# Patient Record
Sex: FEMALE | Race: WHITE | NOT HISPANIC OR LATINO | Employment: UNEMPLOYED | URBAN - METROPOLITAN AREA
[De-identification: names, ages, dates, MRNs, and addresses within clinical notes are randomized per-mention and may not be internally consistent; named-entity substitution may affect disease eponyms.]

---

## 2019-06-11 LAB
AMPHETAMINES UR QL: NEGATIVE
BUPRENORPHINE SERPL-MCNC: NEGATIVE NG/ML
C TRACH RRNA SPEC DONR QL NAA+PROBE: NEGATIVE
CANNABINOIDS SERPL QL: NEGATIVE
COCAINE SERPL CFM-MCNC: NORMAL NG/ML
EXTERNAL ABO GROUPING: (no result)
EXTERNAL AMPHETAMINE SCREEN URINE: NEGATIVE
EXTERNAL ANTIBODY SCREEN: NORMAL
EXTERNAL BARBITURATE SCREEN URINE: NORMAL
EXTERNAL BENZODIAZEPINE SCREEN URINE: NEGATIVE
EXTERNAL HEMATOCRIT: 39 %
EXTERNAL HEMOGLOBIN: 12.8 G/DL
EXTERNAL HEPATITIS B SURFACE ANTIGEN: NEGATIVE
EXTERNAL METHADONE SCREEN URINE: NEGATIVE
EXTERNAL PHENCYCLIDINE SCREEN URINE: NORMAL
EXTERNAL PLATELET COUNT: 248 K/ΜL
EXTERNAL PROPOXYPHENE SCREEN URINE: NORMAL
EXTERNAL RH FACTOR: NEGATIVE
EXTERNAL SYPHILIS RPR SCREEN: (no result)
HCV AB S/CO SERPL IA: NEGATIVE
HIV 1+2 AB+HIV1 P24 AG SERPL QL IA: NORMAL
N GONORRHOEA DNA SPEC QL NAA+PROBE: NEGATIVE
OPIATES UR QL: NEGATIVE
OXYCODONE UR QL SCN: NEGATIVE
RUBV IGG SERPL IA-ACNC: (no result)
TRICYCLICS UR QL SCN: NEGATIVE
VZV IGG SER QL: NORMAL

## 2019-07-02 LAB — 2ND TRIMESTER 4 SCREEN SERPL-IMP: NORMAL

## 2019-10-12 PROBLEM — Z01.419 WELL WOMAN EXAM: Status: ACTIVE | Noted: 2019-10-12

## 2019-10-12 PROBLEM — O09.90 HIGH-RISK PREGNANCY: Status: ACTIVE | Noted: 2019-10-12

## 2019-10-14 ENCOUNTER — LAB (OUTPATIENT)
Dept: LAB | Facility: HOSPITAL | Age: 29
End: 2019-10-14

## 2019-10-14 ENCOUNTER — INITIAL PRENATAL (OUTPATIENT)
Dept: OBSTETRICS AND GYNECOLOGY | Facility: CLINIC | Age: 29
End: 2019-10-14

## 2019-10-14 VITALS
SYSTOLIC BLOOD PRESSURE: 118 MMHG | WEIGHT: 146 LBS | DIASTOLIC BLOOD PRESSURE: 72 MMHG | HEIGHT: 61 IN | BODY MASS INDEX: 27.56 KG/M2

## 2019-10-14 DIAGNOSIS — O09.93 HIGH-RISK PREGNANCY IN THIRD TRIMESTER: Primary | ICD-10-CM

## 2019-10-14 DIAGNOSIS — O09.93 HIGH-RISK PREGNANCY IN THIRD TRIMESTER: ICD-10-CM

## 2019-10-14 DIAGNOSIS — Z67.91 RH NEGATIVE STATE IN ANTEPARTUM PERIOD, THIRD TRIMESTER: ICD-10-CM

## 2019-10-14 DIAGNOSIS — O26.893 RH NEGATIVE STATE IN ANTEPARTUM PERIOD, THIRD TRIMESTER: ICD-10-CM

## 2019-10-14 PROBLEM — O35.BXX0 FETUS WITH COMPLEX CONGENITAL HEART DISEASE, ANTEPARTUM: Status: ACTIVE | Noted: 2019-10-14

## 2019-10-14 PROBLEM — Z78.9 LANGUAGE BARRIER: Status: ACTIVE | Noted: 2019-10-14

## 2019-10-14 PROBLEM — Z60.3 LANGUAGE BARRIER: Status: ACTIVE | Noted: 2019-10-14

## 2019-10-14 PROBLEM — O99.013 ANEMIA IN PREGNANCY, THIRD TRIMESTER: Status: ACTIVE | Noted: 2019-10-14

## 2019-10-14 PROBLEM — Z75.8 LANGUAGE BARRIER: Status: ACTIVE | Noted: 2019-10-14

## 2019-10-14 LAB
BLD GP AB SCN SERPL QL: NEGATIVE
GLUCOSE 1H P 100 G GLC PO SERPL-MCNC: 127 MG/DL (ref 65–140)
HCT VFR BLD AUTO: 35.6 % (ref 34–46.6)
HGB BLD-MCNC: 11.8 G/DL (ref 12–15.9)

## 2019-10-14 PROCEDURE — 86850 RBC ANTIBODY SCREEN: CPT

## 2019-10-14 PROCEDURE — 82950 GLUCOSE TEST: CPT

## 2019-10-14 PROCEDURE — 36415 COLL VENOUS BLD VENIPUNCTURE: CPT

## 2019-10-14 PROCEDURE — 85018 HEMOGLOBIN: CPT

## 2019-10-14 PROCEDURE — 85014 HEMATOCRIT: CPT

## 2019-10-14 PROCEDURE — 99204 OFFICE O/P NEW MOD 45 MIN: CPT | Performed by: OBSTETRICS & GYNECOLOGY

## 2019-10-14 RX ORDER — PRENATAL VIT/IRON FUM/FOLIC AC 27MG-0.8MG
TABLET ORAL DAILY
COMMUNITY

## 2019-10-14 RX ORDER — FERROUS SULFATE 325(65) MG
325 TABLET ORAL
Qty: 60 TABLET | Refills: 4 | Status: SHIPPED | OUTPATIENT
Start: 2019-10-14 | End: 2020-02-05

## 2019-10-14 NOTE — PROGRESS NOTES
Chief Complaint   Patient presents with   • Initial Prenatal Visit       HPI: Mendy is a  currently at 34w4d who today reports the following:  Contractions - No; Leaking - No; Vaginal bleeding -  No; Swelling of extremities - No.  She is transferring care after moving to the [facility] from [place].  Records have been seen in advance.  Prenatal care is been unremarkable other than a fetal VSD.  She is Rh- and is yet to receive RhoGam.  To date she has not had Glucola.    ROS:  GI: Nausea - No; Constipation - No; Diarrhea - No    Neuro: Headache - No; Visual change - No      EXAM:  Vitals: See prenatal flowsheet   Abdomen: See prenatal flowsheet   Urine glucose/protein: See prenatal flowsheet   Pelvic: See prenatal flowsheet   MDM:   Impression: 1. Supervision of high risk pregnancy  2. Fetal VSD   3. Rh (-)   Tests done today: 1. Needs antibody screen, hemoglobin and Glucola   Topics discussed: 1. Continue with PNV's  2. Prenatal labs reviewed  3. none - she had no major complaints,questions or concerns   Tests scheduled today for her next visit:   GCT  HgB  ABS

## 2019-10-14 NOTE — PROGRESS NOTES
"Subjective   Chief Complaint   Patient presents with   • Initial Prenatal Visit       Mendy Cote is a 29 y.o. year old .  Patient's last menstrual period was 2019 (approximate).  She presents to be seen to initiate prenatal care.     Social History    Tobacco Use      Smoking status: Never Smoker      The following portions of the patient's history were reviewed and updated as appropriate:{history reviewed:84147::\"vital signs\",\"allergies\",\"current medications\",\"past medical history\",\"past social history\",\"past surgical history\",\"problem list\"}.    Objective   Lab Review   {Review - gyn labs:99506::\"No data reviewed\"}    Imaging   {Review - imagin::\"No data reviewed\"}    Assessment/Plan   ASSESSMENT  1. 29 y.o. year old  at ***  2. {Pregnancy Imp:47507::\"Supervision of high risk pregnancy\"}    PLAN  1. The problem list for pregnancy was initiated today  2. Tests ordered today:  Orders Placed This Encounter   Procedures   • Chlamydia trachomatis, Neisseria gonorrhoeae, PCR - , Cervix     This is an external result entered through the Results Console.   • Maternal Screen 4     This is an external result entered through the Results Console.     Order Specific Question:   [facility] Date of last menstrual period or estimated date of delivery (corresponding to calculation method):     Answer:   2019     Order Specific Question:   [facility] Gestational age calculation method:     Answer:   CHINYERE,EDC   • HIV-1 / O / 2 Ag / Antibody 4th Generation     This is an external result entered through the Results Console.   • Obstetric Panel     This is an external result entered through the Results Console.   • Urine Drug Screen - Urine, Clean Catch     This is an external result entered through the Results Console.   • Obstetric Panel     This is an external result entered through the Results Console.   • Hepatitis C Antibody     This is an external result entered through the Results Console.   • " "Varicella Zoster Antibody, IgG     This is an external result entered through the Results Console.     3. Testing for GC / Chlamydia / trichomonas {Actions; was done:31418::\"will need to be done at her next prenatal visit\"}  4. Genetic testing reviewed: {Genetic testing options:91929}  5. Information reviewed: {Pregnancy - first PN visit checklist:76520::\"exercise in pregnancy\",\"nutrition in pregnancy\",\"weight gain in pregnancy\",\"work and travel restrictions during pregnancy\",\"list of OTC medications acceptable in pregnancy\",\"call coverage groups\"}    Total time spent today with Mendy  was {Time spent:60621}.  Off this time, {Percentage:31233::\"> 50%\"} was spent face-to-face time coordinating care, answering her questions and counseling regarding {Counseling topics:25465::\"pathophysiology of her presenting problem along with plans for any diagnositc work-up and treatment\"}.    Follow up: {numbers 1-12:92661} {DAYS, WEEKS, MONTHS, YEARS:51866}       This note was electronically signed.    Yfn Galvan MD  October 14, 2019      "

## 2019-10-21 ENCOUNTER — ROUTINE PRENATAL (OUTPATIENT)
Dept: OBSTETRICS AND GYNECOLOGY | Facility: CLINIC | Age: 29
End: 2019-10-21

## 2019-10-21 VITALS — WEIGHT: 148 LBS | DIASTOLIC BLOOD PRESSURE: 74 MMHG | BODY MASS INDEX: 27.58 KG/M2 | SYSTOLIC BLOOD PRESSURE: 114 MMHG

## 2019-10-21 DIAGNOSIS — O99.013 ANEMIA IN PREGNANCY, THIRD TRIMESTER: ICD-10-CM

## 2019-10-21 DIAGNOSIS — O09.93 HIGH-RISK PREGNANCY IN THIRD TRIMESTER: Primary | ICD-10-CM

## 2019-10-21 DIAGNOSIS — O35.BXX4: ICD-10-CM

## 2019-10-21 DIAGNOSIS — Z78.9 LANGUAGE BARRIER: ICD-10-CM

## 2019-10-21 DIAGNOSIS — Z67.91 RH NEGATIVE STATE IN ANTEPARTUM PERIOD, THIRD TRIMESTER: ICD-10-CM

## 2019-10-21 DIAGNOSIS — O26.893 RH NEGATIVE STATE IN ANTEPARTUM PERIOD, THIRD TRIMESTER: ICD-10-CM

## 2019-10-21 LAB — GP B STREP RRNA SPEC QL PROBE: NEGATIVE

## 2019-10-21 PROCEDURE — 96372 THER/PROPH/DIAG INJ SC/IM: CPT | Performed by: OBSTETRICS & GYNECOLOGY

## 2019-10-21 PROCEDURE — 99213 OFFICE O/P EST LOW 20 MIN: CPT | Performed by: OBSTETRICS & GYNECOLOGY

## 2019-10-21 NOTE — PROGRESS NOTES
Chief Complaint   Patient presents with   • Routine Prenatal Visit       HPI: Mendy is a  currently at 35w4d who today reports the following:  Contractions - No; Leaking - No; Vaginal bleeding -  No; Swelling of extremities - No.    ROS:  GI: Nausea - No; Constipation - No; Diarrhea - No    Neuro: Headache - No; Visual change - No      EXAM:  Vitals: See prenatal flowsheet   Abdomen: See prenatal flowsheet   Urine glucose/protein: See prenatal flowsheet   Pelvic: See prenatal flowsheet   MDM:   Impression: 1. Supervision of high risk pregnancy  2. Anemia in pregnancy  3. Fetal VSD   4. Rh (-)   Tests done today: 1. GBS testing   Topics discussed: 1. Continue with PNV's  2. Prenatal labs reviewed  3. Rhogam   4. Needs U/S for position today   Tests scheduled today for her next visit:   none

## 2019-10-28 ENCOUNTER — DOCUMENTATION (OUTPATIENT)
Dept: OBSTETRICS AND GYNECOLOGY | Facility: CLINIC | Age: 29
End: 2019-10-28

## 2019-10-30 ENCOUNTER — ROUTINE PRENATAL (OUTPATIENT)
Dept: OBSTETRICS AND GYNECOLOGY | Facility: CLINIC | Age: 29
End: 2019-10-30

## 2019-10-30 VITALS — SYSTOLIC BLOOD PRESSURE: 122 MMHG | WEIGHT: 148 LBS | DIASTOLIC BLOOD PRESSURE: 74 MMHG | BODY MASS INDEX: 27.58 KG/M2

## 2019-10-30 DIAGNOSIS — Z67.91 RH NEGATIVE STATE IN ANTEPARTUM PERIOD, THIRD TRIMESTER: ICD-10-CM

## 2019-10-30 DIAGNOSIS — O26.893 RH NEGATIVE STATE IN ANTEPARTUM PERIOD, THIRD TRIMESTER: ICD-10-CM

## 2019-10-30 DIAGNOSIS — Z78.9 LANGUAGE BARRIER: ICD-10-CM

## 2019-10-30 DIAGNOSIS — O09.93 HIGH-RISK PREGNANCY IN THIRD TRIMESTER: ICD-10-CM

## 2019-10-30 DIAGNOSIS — O99.013 ANEMIA IN PREGNANCY, THIRD TRIMESTER: ICD-10-CM

## 2019-10-30 PROCEDURE — 99213 OFFICE O/P EST LOW 20 MIN: CPT | Performed by: OBSTETRICS & GYNECOLOGY

## 2019-10-30 NOTE — PROGRESS NOTES
Chief Complaint   Patient presents with   • Routine Prenatal Visit     Patient was seen today with help of a  and interview was conducted with his help.      HPI: Mendy is a  currently at 36w6d who today reports the following:  Contractions - No; Leaking - No; Vaginal bleeding -  No; Swelling of extremities - No.    ROS:  GI: Nausea - No; Constipation - No; Diarrhea - No    Neuro: Headache - No; Visual change - No      EXAM:  Vitals: See prenatal flowsheet   Abdomen: See prenatal flowsheet   Urine glucose/protein: See prenatal flowsheet   Pelvic: See prenatal flowsheet   MDM:   Impression: 1. Supervision of high risk pregnancy   2. Breech   Tests done today: 1. Ultrasound for position   Topics discussed: 1. Continue with PNV's  2. Prenatal labs reviewed  3. Explained process of external cephalic version with quoted success rate of 70% with re-flip rate of 5%.  Small risks of fetal distress necessitating emergent  section reviewed.  They will think about it and call back in the next couple of days to let us know if they want to try external cephalic version.  In the absence of converted to the vertex presentation will need  birth   Tests scheduled today for her next visit:   none

## 2019-10-31 ENCOUNTER — TELEPHONE (OUTPATIENT)
Dept: OBSTETRICS AND GYNECOLOGY | Facility: CLINIC | Age: 29
End: 2019-10-31

## 2019-10-31 ENCOUNTER — HOSPITAL ENCOUNTER (OUTPATIENT)
Facility: HOSPITAL | Age: 29
Setting detail: SURGERY ADMIT
End: 2019-10-31
Attending: OBSTETRICS & GYNECOLOGY | Admitting: OBSTETRICS & GYNECOLOGY

## 2019-10-31 ENCOUNTER — PREP FOR SURGERY (OUTPATIENT)
Dept: OTHER | Facility: HOSPITAL | Age: 29
End: 2019-10-31

## 2019-10-31 RX ORDER — SODIUM CHLORIDE, SODIUM LACTATE, POTASSIUM CHLORIDE, CALCIUM CHLORIDE 600; 310; 30; 20 MG/100ML; MG/100ML; MG/100ML; MG/100ML
125 INJECTION, SOLUTION INTRAVENOUS CONTINUOUS
Status: CANCELLED | OUTPATIENT
Start: 2019-10-31

## 2019-10-31 RX ORDER — SODIUM CHLORIDE 0.9 % (FLUSH) 0.9 %
3 SYRINGE (ML) INJECTION EVERY 12 HOURS SCHEDULED
Status: CANCELLED | OUTPATIENT
Start: 2019-10-31

## 2019-10-31 RX ORDER — CEFAZOLIN SODIUM 2 G/100ML
2 INJECTION, SOLUTION INTRAVENOUS ONCE
Status: CANCELLED | OUTPATIENT
Start: 2019-10-31 | End: 2019-10-31

## 2019-10-31 RX ORDER — SODIUM CHLORIDE 0.9 % (FLUSH) 0.9 %
1-10 SYRINGE (ML) INJECTION AS NEEDED
Status: CANCELLED | OUTPATIENT
Start: 2019-10-31

## 2019-10-31 RX ORDER — MISOPROSTOL 200 UG/1
800 TABLET ORAL AS NEEDED
Status: CANCELLED | OUTPATIENT
Start: 2019-10-31

## 2019-10-31 RX ORDER — PROMETHAZINE HYDROCHLORIDE 12.5 MG/1
12.5 TABLET ORAL EVERY 6 HOURS PRN
Status: CANCELLED | OUTPATIENT
Start: 2019-10-31

## 2019-10-31 RX ORDER — OXYTOCIN-SODIUM CHLORIDE 0.9% IV SOLN 30 UNIT/500ML 30-0.9/5 UT/ML-%
650 SOLUTION INTRAVENOUS ONCE
Status: CANCELLED | OUTPATIENT
Start: 2019-10-31 | End: 2019-10-31

## 2019-10-31 RX ORDER — PROMETHAZINE HYDROCHLORIDE 12.5 MG/1
12.5 SUPPOSITORY RECTAL EVERY 6 HOURS PRN
Status: CANCELLED | OUTPATIENT
Start: 2019-10-31

## 2019-10-31 RX ORDER — LIDOCAINE HYDROCHLORIDE 10 MG/ML
5 INJECTION, SOLUTION EPIDURAL; INFILTRATION; INTRACAUDAL; PERINEURAL AS NEEDED
Status: CANCELLED | OUTPATIENT
Start: 2019-10-31

## 2019-10-31 RX ORDER — TRISODIUM CITRATE DIHYDRATE AND CITRIC ACID MONOHYDRATE 500; 334 MG/5ML; MG/5ML
30 SOLUTION ORAL ONCE
Status: CANCELLED | OUTPATIENT
Start: 2019-10-31 | End: 2019-10-31

## 2019-10-31 RX ORDER — CARBOPROST TROMETHAMINE 250 UG/ML
250 INJECTION, SOLUTION INTRAMUSCULAR AS NEEDED
Status: CANCELLED | OUTPATIENT
Start: 2019-10-31

## 2019-10-31 RX ORDER — OXYTOCIN-SODIUM CHLORIDE 0.9% IV SOLN 30 UNIT/500ML 30-0.9/5 UT/ML-%
85 SOLUTION INTRAVENOUS ONCE
Status: CANCELLED | OUTPATIENT
Start: 2019-10-31 | End: 2019-10-31

## 2019-10-31 RX ORDER — METHYLERGONOVINE MALEATE 0.2 MG/ML
200 INJECTION INTRAVENOUS ONCE AS NEEDED
Status: CANCELLED | OUTPATIENT
Start: 2019-10-31

## 2019-10-31 RX ORDER — PROMETHAZINE HYDROCHLORIDE 25 MG/ML
12.5 INJECTION, SOLUTION INTRAMUSCULAR; INTRAVENOUS EVERY 4 HOURS PRN
Status: CANCELLED | OUTPATIENT
Start: 2019-10-31

## 2019-10-31 NOTE — TELEPHONE ENCOUNTER
Primary C/Section scheduled for 11/19/19 ay 130pm. I called Dinora to inform her and to tell Mendy about this date and time.  I will also send a packet to patient regarding this as well.

## 2019-10-31 NOTE — TELEPHONE ENCOUNTER
Putting in a case request so we can get it on the schedule.  We can talk to her about the date we selected when she comes in

## 2019-10-31 NOTE — TELEPHONE ENCOUNTER
Dinora - friend of patient called to inform that Mendy doesn't want to have the external cephalic version.

## 2019-11-06 ENCOUNTER — ROUTINE PRENATAL (OUTPATIENT)
Dept: OBSTETRICS AND GYNECOLOGY | Facility: CLINIC | Age: 29
End: 2019-11-06

## 2019-11-06 VITALS — SYSTOLIC BLOOD PRESSURE: 118 MMHG | DIASTOLIC BLOOD PRESSURE: 72 MMHG | WEIGHT: 152 LBS | BODY MASS INDEX: 28.33 KG/M2

## 2019-11-06 DIAGNOSIS — O99.013 ANEMIA IN PREGNANCY, THIRD TRIMESTER: ICD-10-CM

## 2019-11-06 DIAGNOSIS — O26.893 RH NEGATIVE STATE IN ANTEPARTUM PERIOD, THIRD TRIMESTER: ICD-10-CM

## 2019-11-06 DIAGNOSIS — O09.93 HIGH-RISK PREGNANCY IN THIRD TRIMESTER: ICD-10-CM

## 2019-11-06 DIAGNOSIS — Z67.91 RH NEGATIVE STATE IN ANTEPARTUM PERIOD, THIRD TRIMESTER: ICD-10-CM

## 2019-11-06 DIAGNOSIS — Z78.9 LANGUAGE BARRIER: ICD-10-CM

## 2019-11-06 PROCEDURE — 99213 OFFICE O/P EST LOW 20 MIN: CPT | Performed by: OBSTETRICS & GYNECOLOGY

## 2019-11-06 NOTE — PROGRESS NOTES
Chief Complaint   Patient presents with   • Routine Prenatal Visit       HPI: Mendy is a  currently at 37w6d who today reports the following:  Contractions - No; Leaking - No; Vaginal bleeding -  No; Swelling of extremities - No.    ROS:  GI: Nausea - No; Constipation - No; Diarrhea - No    Neuro: Headache - No; Visual change - No      EXAM:  Vitals: See prenatal flowsheet   Abdomen: See prenatal flowsheet   Urine glucose/protein: See prenatal flowsheet   Pelvic: See prenatal flowsheet   MDM:   Impression: 1. Supervision of high risk pregnancy   2. Probable conversion to vertex spontaneously   Tests done today: 1. U/S for position - now vertex!!!!   Topics discussed: 1. Continue with PNV's  2. Prenatal labs reviewed   Tests scheduled today for her next visit:   none

## 2019-11-14 ENCOUNTER — ROUTINE PRENATAL (OUTPATIENT)
Dept: OBSTETRICS AND GYNECOLOGY | Facility: CLINIC | Age: 29
End: 2019-11-14

## 2019-11-14 VITALS — WEIGHT: 153 LBS | DIASTOLIC BLOOD PRESSURE: 80 MMHG | SYSTOLIC BLOOD PRESSURE: 124 MMHG | BODY MASS INDEX: 28.52 KG/M2

## 2019-11-14 DIAGNOSIS — Z78.9 LANGUAGE BARRIER: ICD-10-CM

## 2019-11-14 DIAGNOSIS — O26.893 RH NEGATIVE STATE IN ANTEPARTUM PERIOD, THIRD TRIMESTER: ICD-10-CM

## 2019-11-14 DIAGNOSIS — O99.013 ANEMIA IN PREGNANCY, THIRD TRIMESTER: ICD-10-CM

## 2019-11-14 DIAGNOSIS — Z67.91 RH NEGATIVE STATE IN ANTEPARTUM PERIOD, THIRD TRIMESTER: ICD-10-CM

## 2019-11-14 DIAGNOSIS — O09.93 HIGH-RISK PREGNANCY IN THIRD TRIMESTER: ICD-10-CM

## 2019-11-14 PROCEDURE — 99213 OFFICE O/P EST LOW 20 MIN: CPT | Performed by: OBSTETRICS & GYNECOLOGY

## 2019-11-14 NOTE — PROGRESS NOTES
Chief Complaint   Patient presents with   • Routine Prenatal Visit       HPI: Mendy is a  currently at 39w0d who today reports the following:  Contractions - No; Leaking - No; Vaginal bleeding -  No; Swelling of extremities - No.    ROS:  GI: Nausea - No; Constipation - No; Diarrhea - No    Neuro: Headache - No; Visual change - No      EXAM:  Vitals: See prenatal flowsheet   Abdomen: See prenatal flowsheet   Urine glucose/protein: See prenatal flowsheet   Pelvic: See prenatal flowsheet   MDM:   Impression: 1. Supervision of low risk pregnancy  2. Anemia in pregnancy   Tests done today: 1. none   Topics discussed: 1. Continue with PNV's  2. Prenatal labs reviewed  3. IOL offered - declined   Tests scheduled today for her next visit:   none

## 2019-11-20 ENCOUNTER — ANESTHESIA EVENT (OUTPATIENT)
Dept: LABOR AND DELIVERY | Facility: HOSPITAL | Age: 29
End: 2019-11-20

## 2019-11-20 ENCOUNTER — HOSPITAL ENCOUNTER (INPATIENT)
Facility: HOSPITAL | Age: 29
LOS: 2 days | Discharge: HOME | End: 2019-11-22
Attending: OBSTETRICS & GYNECOLOGY | Admitting: OBSTETRICS & GYNECOLOGY

## 2019-11-20 ENCOUNTER — ANESTHESIA (OUTPATIENT)
Dept: LABOR AND DELIVERY | Facility: HOSPITAL | Age: 29
End: 2019-11-20

## 2019-11-20 PROBLEM — Z67.91 RH NEGATIVE STATE IN ANTEPARTUM PERIOD, THIRD TRIMESTER: Status: RESOLVED | Noted: 2019-10-14 | Resolved: 2019-11-20

## 2019-11-20 PROBLEM — O09.93 HIGH-RISK PREGNANCY IN THIRD TRIMESTER: Status: RESOLVED | Noted: 2019-10-12 | Resolved: 2019-11-20

## 2019-11-20 PROBLEM — O35.BXX0 FETUS WITH COMPLEX CONGENITAL HEART DISEASE, ANTEPARTUM: Status: RESOLVED | Noted: 2019-10-14 | Resolved: 2019-11-20

## 2019-11-20 PROBLEM — O99.013 ANEMIA IN PREGNANCY, THIRD TRIMESTER: Status: RESOLVED | Noted: 2019-10-14 | Resolved: 2019-11-20

## 2019-11-20 PROBLEM — Z3A.39 39 WEEKS GESTATION OF PREGNANCY: Status: RESOLVED | Noted: 2019-11-20 | Resolved: 2019-11-20

## 2019-11-20 PROBLEM — Z34.90 PREGNANCY: Status: ACTIVE | Noted: 2019-11-20

## 2019-11-20 PROBLEM — Z3A.39 39 WEEKS GESTATION OF PREGNANCY: Status: ACTIVE | Noted: 2019-11-20

## 2019-11-20 PROBLEM — Z60.3 LANGUAGE BARRIER: Status: RESOLVED | Noted: 2019-10-14 | Resolved: 2019-11-20

## 2019-11-20 PROBLEM — O26.893 RH NEGATIVE STATE IN ANTEPARTUM PERIOD, THIRD TRIMESTER: Status: RESOLVED | Noted: 2019-10-14 | Resolved: 2019-11-20

## 2019-11-20 PROBLEM — Z78.9 LANGUAGE BARRIER: Status: RESOLVED | Noted: 2019-10-14 | Resolved: 2019-11-20

## 2019-11-20 PROBLEM — Z75.8 LANGUAGE BARRIER: Status: RESOLVED | Noted: 2019-10-14 | Resolved: 2019-11-20

## 2019-11-20 LAB
ABO GROUP BLD: NORMAL
ABO GROUP BLD: NORMAL
AMPHET+METHAMPHET UR QL: NEGATIVE
AMPHETAMINES UR QL: NEGATIVE
ANTI-D, PASSIVE: NORMAL
BARBITURATES UR QL SCN: NEGATIVE
BENZODIAZ UR QL SCN: NEGATIVE
BLD GP AB SCN SERPL QL: POSITIVE
BUPRENORPHINE SERPL-MCNC: NEGATIVE NG/ML
CANNABINOIDS SERPL QL: NEGATIVE
COCAINE UR QL: NEGATIVE
DEPRECATED RDW RBC AUTO: 48.5 FL (ref 37–54)
ERYTHROCYTE [DISTWIDTH] IN BLOOD BY AUTOMATED COUNT: 15.3 % (ref 12.3–15.4)
FETAL BLEED: NEGATIVE
HCT VFR BLD AUTO: 36.5 % (ref 34–46.6)
HGB BLD-MCNC: 12.2 G/DL (ref 12–15.9)
MCH RBC QN AUTO: 29.1 PG (ref 26.6–33)
MCHC RBC AUTO-ENTMCNC: 33.4 G/DL (ref 31.5–35.7)
MCV RBC AUTO: 87.1 FL (ref 79–97)
METHADONE UR QL SCN: NEGATIVE
NUMBER OF DOSES: NORMAL
OPIATES UR QL: NEGATIVE
OXYCODONE UR QL SCN: NEGATIVE
PCP UR QL SCN: NEGATIVE
PLATELET # BLD AUTO: 166 10*3/MM3 (ref 140–450)
PMV BLD AUTO: 11.5 FL (ref 6–12)
PROPOXYPH UR QL: NEGATIVE
RBC # BLD AUTO: 4.19 10*6/MM3 (ref 3.77–5.28)
RH BLD: NEGATIVE
RH BLD: NEGATIVE
T&S EXPIRATION DATE: NORMAL
TRICYCLICS UR QL SCN: NEGATIVE
WBC NRBC COR # BLD: 13.56 10*3/MM3 (ref 3.4–10.8)

## 2019-11-20 PROCEDURE — 25010000002 ROPIVACAINE PER 1 MG: Performed by: NURSE ANESTHETIST, CERTIFIED REGISTERED

## 2019-11-20 PROCEDURE — 85027 COMPLETE CBC AUTOMATED: CPT | Performed by: OBSTETRICS & GYNECOLOGY

## 2019-11-20 PROCEDURE — 25010000002 RHO D IMMUNE GLOBULIN 1500 UNIT/2ML SOLUTION PREFILLED SYRINGE: Performed by: OBSTETRICS & GYNECOLOGY

## 2019-11-20 PROCEDURE — 86850 RBC ANTIBODY SCREEN: CPT | Performed by: OBSTETRICS & GYNECOLOGY

## 2019-11-20 PROCEDURE — 86900 BLOOD TYPING SEROLOGIC ABO: CPT

## 2019-11-20 PROCEDURE — 85461 HEMOGLOBIN FETAL: CPT | Performed by: OBSTETRICS & GYNECOLOGY

## 2019-11-20 PROCEDURE — 86900 BLOOD TYPING SEROLOGIC ABO: CPT | Performed by: OBSTETRICS & GYNECOLOGY

## 2019-11-20 PROCEDURE — C1755 CATHETER, INTRASPINAL: HCPCS | Performed by: ANESTHESIOLOGY

## 2019-11-20 PROCEDURE — 59409 OBSTETRICAL CARE: CPT | Performed by: OBSTETRICS & GYNECOLOGY

## 2019-11-20 PROCEDURE — 0HQ9XZZ REPAIR PERINEUM SKIN, EXTERNAL APPROACH: ICD-10-PCS | Performed by: OBSTETRICS & GYNECOLOGY

## 2019-11-20 PROCEDURE — 25010000002 FENTANYL CITRATE (PF) 100 MCG/2ML SOLUTION: Performed by: NURSE ANESTHETIST, CERTIFIED REGISTERED

## 2019-11-20 PROCEDURE — 86870 RBC ANTIBODY IDENTIFICATION: CPT | Performed by: OBSTETRICS & GYNECOLOGY

## 2019-11-20 PROCEDURE — 86901 BLOOD TYPING SEROLOGIC RH(D): CPT | Performed by: OBSTETRICS & GYNECOLOGY

## 2019-11-20 PROCEDURE — C1755 CATHETER, INTRASPINAL: HCPCS

## 2019-11-20 PROCEDURE — 80307 DRUG TEST PRSMV CHEM ANLYZR: CPT | Performed by: OBSTETRICS & GYNECOLOGY

## 2019-11-20 PROCEDURE — 86901 BLOOD TYPING SEROLOGIC RH(D): CPT

## 2019-11-20 PROCEDURE — 59025 FETAL NON-STRESS TEST: CPT

## 2019-11-20 RX ORDER — OXYCODONE HYDROCHLORIDE AND ACETAMINOPHEN 5; 325 MG/1; MG/1
1 TABLET ORAL EVERY 4 HOURS PRN
Status: DISCONTINUED | OUTPATIENT
Start: 2019-11-20 | End: 2019-11-20 | Stop reason: HOSPADM

## 2019-11-20 RX ORDER — PROMETHAZINE HYDROCHLORIDE 25 MG/ML
12.5 INJECTION, SOLUTION INTRAMUSCULAR; INTRAVENOUS EVERY 4 HOURS PRN
Status: DISCONTINUED | OUTPATIENT
Start: 2019-11-20 | End: 2019-11-20

## 2019-11-20 RX ORDER — PROMETHAZINE HYDROCHLORIDE 25 MG/ML
12.5 INJECTION, SOLUTION INTRAMUSCULAR; INTRAVENOUS EVERY 6 HOURS PRN
Status: DISCONTINUED | OUTPATIENT
Start: 2019-11-20 | End: 2019-11-20 | Stop reason: HOSPADM

## 2019-11-20 RX ORDER — OXYTOCIN-SODIUM CHLORIDE 0.9% IV SOLN 30 UNIT/500ML 30-0.9/5 UT/ML-%
85 SOLUTION INTRAVENOUS ONCE
Status: DISCONTINUED | OUTPATIENT
Start: 2019-11-20 | End: 2019-11-20 | Stop reason: HOSPADM

## 2019-11-20 RX ORDER — METHYLERGONOVINE MALEATE 0.2 MG/ML
200 INJECTION INTRAVENOUS
Status: DISCONTINUED | OUTPATIENT
Start: 2019-11-20 | End: 2019-11-20

## 2019-11-20 RX ORDER — MISOPROSTOL 200 UG/1
800 TABLET ORAL ONCE
Status: DISCONTINUED | OUTPATIENT
Start: 2019-11-20 | End: 2019-11-20

## 2019-11-20 RX ORDER — SODIUM CHLORIDE 0.9 % (FLUSH) 0.9 %
1-10 SYRINGE (ML) INJECTION AS NEEDED
Status: DISCONTINUED | OUTPATIENT
Start: 2019-11-20 | End: 2019-11-20

## 2019-11-20 RX ORDER — SODIUM CHLORIDE, SODIUM LACTATE, POTASSIUM CHLORIDE, CALCIUM CHLORIDE 600; 310; 30; 20 MG/100ML; MG/100ML; MG/100ML; MG/100ML
125 INJECTION, SOLUTION INTRAVENOUS CONTINUOUS
Status: DISCONTINUED | OUTPATIENT
Start: 2019-11-20 | End: 2019-11-20

## 2019-11-20 RX ORDER — OXYCODONE HYDROCHLORIDE AND ACETAMINOPHEN 5; 325 MG/1; MG/1
1 TABLET ORAL EVERY 4 HOURS PRN
Status: DISCONTINUED | OUTPATIENT
Start: 2019-11-20 | End: 2019-11-22 | Stop reason: HOSPADM

## 2019-11-20 RX ORDER — SODIUM CHLORIDE 0.9 % (FLUSH) 0.9 %
3 SYRINGE (ML) INJECTION EVERY 12 HOURS SCHEDULED
Status: DISCONTINUED | OUTPATIENT
Start: 2019-11-20 | End: 2019-11-20

## 2019-11-20 RX ORDER — OXYTOCIN-SODIUM CHLORIDE 0.9% IV SOLN 30 UNIT/500ML 30-0.9/5 UT/ML-%
650 SOLUTION INTRAVENOUS ONCE
Status: DISCONTINUED | OUTPATIENT
Start: 2019-11-20 | End: 2019-11-20 | Stop reason: HOSPADM

## 2019-11-20 RX ORDER — PROMETHAZINE HYDROCHLORIDE 12.5 MG/1
12.5 TABLET ORAL EVERY 6 HOURS PRN
Status: DISCONTINUED | OUTPATIENT
Start: 2019-11-20 | End: 2019-11-20

## 2019-11-20 RX ORDER — METHYLERGONOVINE MALEATE 0.2 MG/ML
200 INJECTION INTRAVENOUS ONCE AS NEEDED
Status: DISCONTINUED | OUTPATIENT
Start: 2019-11-20 | End: 2019-11-20 | Stop reason: HOSPADM

## 2019-11-20 RX ORDER — DIPHENHYDRAMINE HYDROCHLORIDE 50 MG/ML
12.5 INJECTION INTRAMUSCULAR; INTRAVENOUS EVERY 8 HOURS PRN
Status: DISCONTINUED | OUTPATIENT
Start: 2019-11-20 | End: 2019-11-20

## 2019-11-20 RX ORDER — FAMOTIDINE 10 MG/ML
20 INJECTION, SOLUTION INTRAVENOUS ONCE AS NEEDED
Status: DISCONTINUED | OUTPATIENT
Start: 2019-11-20 | End: 2019-11-20

## 2019-11-20 RX ORDER — IBUPROFEN 600 MG/1
600 TABLET ORAL EVERY 6 HOURS PRN
Status: DISCONTINUED | OUTPATIENT
Start: 2019-11-20 | End: 2019-11-22 | Stop reason: HOSPADM

## 2019-11-20 RX ORDER — BUTORPHANOL TARTRATE 1 MG/ML
1 INJECTION, SOLUTION INTRAMUSCULAR; INTRAVENOUS
Status: DISCONTINUED | OUTPATIENT
Start: 2019-11-20 | End: 2019-11-20

## 2019-11-20 RX ORDER — OXYCODONE HYDROCHLORIDE AND ACETAMINOPHEN 5; 325 MG/1; MG/1
2 TABLET ORAL EVERY 4 HOURS PRN
Status: DISCONTINUED | OUTPATIENT
Start: 2019-11-20 | End: 2019-11-20

## 2019-11-20 RX ORDER — CARBOPROST TROMETHAMINE 250 UG/ML
250 INJECTION, SOLUTION INTRAMUSCULAR AS NEEDED
Status: DISCONTINUED | OUTPATIENT
Start: 2019-11-20 | End: 2019-11-20 | Stop reason: HOSPADM

## 2019-11-20 RX ORDER — OXYTOCIN-SODIUM CHLORIDE 0.9% IV SOLN 30 UNIT/500ML 30-0.9/5 UT/ML-%
650 SOLUTION INTRAVENOUS ONCE
Status: COMPLETED | OUTPATIENT
Start: 2019-11-20 | End: 2019-11-20

## 2019-11-20 RX ORDER — DOCUSATE SODIUM 100 MG/1
100 CAPSULE, LIQUID FILLED ORAL 2 TIMES DAILY PRN
Status: DISCONTINUED | OUTPATIENT
Start: 2019-11-20 | End: 2019-11-22 | Stop reason: HOSPADM

## 2019-11-20 RX ORDER — EPHEDRINE SULFATE/0.9% NACL/PF 25 MG/5 ML
10 SYRINGE (ML) INTRAVENOUS
Status: DISCONTINUED | OUTPATIENT
Start: 2019-11-20 | End: 2019-11-20

## 2019-11-20 RX ORDER — PROMETHAZINE HYDROCHLORIDE 12.5 MG/1
12.5 SUPPOSITORY RECTAL EVERY 6 HOURS PRN
Status: DISCONTINUED | OUTPATIENT
Start: 2019-11-20 | End: 2019-11-20

## 2019-11-20 RX ORDER — MISOPROSTOL 200 UG/1
800 TABLET ORAL AS NEEDED
Status: DISCONTINUED | OUTPATIENT
Start: 2019-11-20 | End: 2019-11-20 | Stop reason: HOSPADM

## 2019-11-20 RX ORDER — MAGNESIUM CARB/ALUMINUM HYDROX 105-160MG
30 TABLET,CHEWABLE ORAL ONCE
Status: DISCONTINUED | OUTPATIENT
Start: 2019-11-20 | End: 2019-11-20

## 2019-11-20 RX ORDER — CARBOPROST TROMETHAMINE 250 UG/ML
250 INJECTION, SOLUTION INTRAMUSCULAR
Status: DISCONTINUED | OUTPATIENT
Start: 2019-11-20 | End: 2019-11-20

## 2019-11-20 RX ORDER — TRISODIUM CITRATE DIHYDRATE AND CITRIC ACID MONOHYDRATE 500; 334 MG/5ML; MG/5ML
30 SOLUTION ORAL ONCE
Status: DISCONTINUED | OUTPATIENT
Start: 2019-11-20 | End: 2019-11-20

## 2019-11-20 RX ORDER — PROMETHAZINE HYDROCHLORIDE 12.5 MG/1
12.5 SUPPOSITORY RECTAL EVERY 6 HOURS PRN
Status: DISCONTINUED | OUTPATIENT
Start: 2019-11-20 | End: 2019-11-20 | Stop reason: HOSPADM

## 2019-11-20 RX ORDER — PROMETHAZINE HYDROCHLORIDE 12.5 MG/1
12.5 TABLET ORAL EVERY 6 HOURS PRN
Status: DISCONTINUED | OUTPATIENT
Start: 2019-11-20 | End: 2019-11-20 | Stop reason: HOSPADM

## 2019-11-20 RX ORDER — LIDOCAINE HYDROCHLORIDE AND EPINEPHRINE 15; 5 MG/ML; UG/ML
INJECTION, SOLUTION EPIDURAL AS NEEDED
Status: DISCONTINUED | OUTPATIENT
Start: 2019-11-20 | End: 2019-11-20 | Stop reason: SURG

## 2019-11-20 RX ORDER — ONDANSETRON 2 MG/ML
4 INJECTION INTRAMUSCULAR; INTRAVENOUS ONCE AS NEEDED
Status: DISCONTINUED | OUTPATIENT
Start: 2019-11-20 | End: 2019-11-20

## 2019-11-20 RX ORDER — OXYTOCIN-SODIUM CHLORIDE 0.9% IV SOLN 30 UNIT/500ML 30-0.9/5 UT/ML-%
2-24 SOLUTION INTRAVENOUS
Status: DISCONTINUED | OUTPATIENT
Start: 2019-11-20 | End: 2019-11-20

## 2019-11-20 RX ORDER — IBUPROFEN 600 MG/1
600 TABLET ORAL EVERY 6 HOURS PRN
Status: DISCONTINUED | OUTPATIENT
Start: 2019-11-20 | End: 2019-11-20

## 2019-11-20 RX ORDER — MORPHINE SULFATE 2 MG/ML
2 INJECTION, SOLUTION INTRAMUSCULAR; INTRAVENOUS
Status: DISCONTINUED | OUTPATIENT
Start: 2019-11-20 | End: 2019-11-20 | Stop reason: HOSPADM

## 2019-11-20 RX ORDER — IBUPROFEN 600 MG/1
600 TABLET ORAL EVERY 6 HOURS PRN
Status: DISCONTINUED | OUTPATIENT
Start: 2019-11-20 | End: 2019-11-20 | Stop reason: HOSPADM

## 2019-11-20 RX ORDER — BISACODYL 10 MG
10 SUPPOSITORY, RECTAL RECTAL DAILY PRN
Status: DISCONTINUED | OUTPATIENT
Start: 2019-11-21 | End: 2019-11-22 | Stop reason: HOSPADM

## 2019-11-20 RX ORDER — LIDOCAINE HYDROCHLORIDE 10 MG/ML
5 INJECTION, SOLUTION EPIDURAL; INFILTRATION; INTRACAUDAL; PERINEURAL AS NEEDED
Status: DISCONTINUED | OUTPATIENT
Start: 2019-11-20 | End: 2019-11-20

## 2019-11-20 RX ORDER — OXYCODONE HYDROCHLORIDE AND ACETAMINOPHEN 5; 325 MG/1; MG/1
2 TABLET ORAL EVERY 4 HOURS PRN
Status: DISCONTINUED | OUTPATIENT
Start: 2019-11-20 | End: 2019-11-20 | Stop reason: HOSPADM

## 2019-11-20 RX ORDER — FENTANYL CITRATE 50 UG/ML
INJECTION, SOLUTION INTRAMUSCULAR; INTRAVENOUS AS NEEDED
Status: DISCONTINUED | OUTPATIENT
Start: 2019-11-20 | End: 2019-11-20 | Stop reason: SURG

## 2019-11-20 RX ADMIN — Medication 13 ML/HR: at 10:45

## 2019-11-20 RX ADMIN — OXYTOCIN 650 ML/HR: 10 INJECTION INTRAVENOUS at 14:22

## 2019-11-20 RX ADMIN — IBUPROFEN 600 MG: 600 TABLET ORAL at 22:53

## 2019-11-20 RX ADMIN — SODIUM CHLORIDE, POTASSIUM CHLORIDE, SODIUM LACTATE AND CALCIUM CHLORIDE 1000 ML: 600; 310; 30; 20 INJECTION, SOLUTION INTRAVENOUS at 10:30

## 2019-11-20 RX ADMIN — SODIUM CHLORIDE, POTASSIUM CHLORIDE, SODIUM LACTATE AND CALCIUM CHLORIDE 125 ML/HR: 600; 310; 30; 20 INJECTION, SOLUTION INTRAVENOUS at 11:04

## 2019-11-20 RX ADMIN — HUMAN RHO(D) IMMUNE GLOBULIN 1500 UNITS: 1500 SOLUTION INTRAMUSCULAR; INTRAVENOUS at 22:39

## 2019-11-20 RX ADMIN — Medication 10 MG: at 13:41

## 2019-11-20 RX ADMIN — WITCH HAZEL 1 PAD: 500 SOLUTION RECTAL; TOPICAL at 18:43

## 2019-11-20 RX ADMIN — OXYTOCIN 2 MILLI-UNITS/MIN: 10 INJECTION, SOLUTION INTRAMUSCULAR; INTRAVENOUS at 09:12

## 2019-11-20 RX ADMIN — Medication: at 18:43

## 2019-11-20 RX ADMIN — Medication 10 MG: at 11:13

## 2019-11-20 RX ADMIN — SODIUM CHLORIDE, POTASSIUM CHLORIDE, SODIUM LACTATE AND CALCIUM CHLORIDE 125 ML/HR: 600; 310; 30; 20 INJECTION, SOLUTION INTRAVENOUS at 09:01

## 2019-11-20 RX ADMIN — LIDOCAINE HYDROCHLORIDE AND EPINEPHRINE 2 ML: 15; 5 INJECTION, SOLUTION EPIDURAL at 10:41

## 2019-11-20 RX ADMIN — HYDROCORTISONE 2.5% 1 APPLICATION: 25 CREAM TOPICAL at 18:43

## 2019-11-20 RX ADMIN — FENTANYL CITRATE 100 MCG: 50 INJECTION, SOLUTION INTRAMUSCULAR; INTRAVENOUS at 10:41

## 2019-11-20 RX ADMIN — ROPIVACAINE HYDROCHLORIDE 9 ML: 5 INJECTION, SOLUTION EPIDURAL; INFILTRATION; PERINEURAL at 10:43

## 2019-11-20 RX ADMIN — LIDOCAINE HYDROCHLORIDE AND EPINEPHRINE 3 ML: 15; 5 INJECTION, SOLUTION EPIDURAL at 10:38

## 2019-11-20 NOTE — NURSING NOTE
Up to br, unable to void.  Gia care pads and panties applied. Pt denies pain, bleeding light. Iv saline locked.  To NICU with .

## 2019-11-20 NOTE — PLAN OF CARE
Problem: Labor (Cervical Ripen, Induct, Augment) (Adult,Obstetrics,Pediatric)  Goal: Signs and Symptoms of Listed Potential Problems Will be Absent, Minimized or Managed (Labor)  Outcome: Outcome(s) achieved Date Met: 11/20/19

## 2019-11-20 NOTE — H&P
"[facility]  Mendy Cote  : 1990  MRN: [id number]  CSN: [id number]    History and Physical    Subjective   Chief Complaint   Patient presents with   • Contractions     every 5 minutes     Mendy Cote is a 29 y.o. year old  with an Estimated Date of Delivery: 19 currently at 39w6d presenting with minimal leaking fluid with associated irregular contractions.  She was evaluated upon admission thought not to be ruptured.  Contractions were infrequent.  Cervix is favorable.  She wishes to stay for augmentation of labor.  She also reports no vaginal bleeding.    Prenatal care has been with Dr. Galvan.  It has been complicated by a fetal VSD.  She has seen cardiology who thinks [facility].  She is Rh- and received RhoGam at 35 weeks.    Obstetric History       T3      L3     SAB3   TAB0   Ectopic0   Molar0   Multiple0   Live Births3       # Outcome Date GA Lbr Jae/2nd Weight Sex Delivery Anes PTL Lv   7 Current            6 Term 17   2466 g (5 lb 7 oz) F Vag-Spont   KARI      Name: Brittaneyittsha   5 2016           4 Term 05/17/15   2722 g (6 lb) F Vag-Spont   KARI      Name: Marielos   3 2014           2 Term 13   3175 g (7 lb) M Vag-Spont   KARI      Name: Eze   1 2011              Obstetric Comments   2013 - Marelyl    - Marielos   2017 - Anitta     No past medical history on file.  Past Surgical History:   Procedure Laterality Date   • D&C WITH SUCTION      Done in [facility]   • D&C WITH SUCTION     • D&C WITH SUCTION         No Known Allergies  Social History    Tobacco Use      Smoking status: Never Smoker    Review of Systems      Objective   /70 (BP Location: Right arm, Patient Position: Lying)   Pulse 86   Temp 98.1 °F (36.7 °C) (Oral)   Resp 16   Ht 156 cm (61.42\")   Wt 68 kg (149 lb 14.6 oz)   LMP 2019 (Approximate)   Breastfeeding? Yes   BMI 27.94 kg/m²   General: well developed; well " nourished  no acute distress   Heart: Not performed.   Lungs: breathing is unlabored   Abdomen: soft, non-tender; no masses  no umbilical or inguinal hernias are present  no hepato-splenomegaly   FHT's: reassuring and category 1   Cervix: was checked (by RN): 2 cm / 70 % / -1   Presentation: cephalic   Contractions: irregular     Prenatal Labs  Lab Results   Component Value Date    HGB 11.8 (L) 10/14/2019    RUBELLAABIGG immune 06/11/2019    HEPBSAG Negative 06/11/2019    ABSCRN Negative 10/14/2019    HLX0HHF4 neg 06/11/2019    HEPCVIRUSABY negative 06/11/2019     10/14/2019    STREPGPB negative 10/21/2019    CHLAMNAA NEGATIVE 06/11/2019    NGONORRHON negative 06/11/2019          Assessment   1. IUP at 39w6d  2. Group B strep status: negative  3. VSD  4. Rh (-) S/P Rhogam     Plan   1. Augment with pitocin    Yfn Galvan MD  11/20/2019  8:23 AM

## 2019-11-20 NOTE — L&D DELIVERY NOTE
[facility]  Vaginal Delivery Note    Delivery details     Delivery: Vaginal, Spontaneous     YOB: 2019    Time of Birth: 2:14 PM      Anesthesia: Epidural     Delivering clinician: Yfn Galvan    Forceps?   No   Vacuum? No    Shoulder dystocia present: No      Delivery narrative: Uncomplicated delivery from occiput anterior over intact perineum.  Mouth and nose bulb suction on perineum and again after delivery.  After 30 seconds of delay, cord clamped and infant placed on maternal abdomen.  After collecting cord bloods, placenta delivered spontaneously and intact with normal three-vessel cord.  Laceration in the midline just underneath the clitoris repaired with interrupted 3-0 chromic.  Small first-degree perineal laceration repaired with 3-0 chromic.    Infant details    Findings: male  infant     Infant observations: Weight: 3500 g (7 lb 11.5 oz)   Length: 20  in   Apgars:    @ 1 minute /       @ 5 minutes     Placenta, Cord, and Fluid    Placenta delivered  Spontaneous  at   11/20/2019  2:22 PM     Cord: 3 vessels  present.   Nuchal Cord?  no   Cord blood obtained: Yes      Repair    Episiotomy: None    Estimated Blood Loss: Est. Blood Loss (mL): 250 mL(Filed from Delivery Summary) (11/20/19 6212)       Complications  none    Disposition  Mother to Mother Baby/Postpartum  in stable condition currently.  Baby to NBN  in stable condition currently.      Yfn Galvan MD  11/20/19  2:36 PM

## 2019-11-20 NOTE — ANESTHESIA PROCEDURE NOTES
Labor Epidural      Patient reassessed immediately prior to procedure    Patient location during procedure: OB  Performed By  CRNA: Mery Berg CRNA  Preanesthetic Checklist  Completed: patient identified, surgical consent, pre-op evaluation, timeout performed, IV checked, risks and benefits discussed and monitors and equipment checked  Prep:  Pt Position:sitting  Sterile Tech:cap, gloves, mask and sterile barrier  Prep:DuraPrep  Monitoring:blood pressure monitoring  Epidural Block Procedure:  Approach:midline  Guidance:palpation technique  Location:L4-L5  Needle Type:Tuohy  Needle Gauge:17 G  Loss of Resistance Medium: saline  Loss of Resistance: 6cm  Cath Depth at skin:10 cm  Paresthesia: left and transient  Aspiration:negative  Test Dose:negative  Number of Attempts: 1  Post Assessment:  Dressing:occlusive dressing applied and secured with tape  Pt Tolerance:patient tolerated the procedure well with no apparent complications  Complications:no

## 2019-11-20 NOTE — ANESTHESIA PREPROCEDURE EVALUATION
Anesthesia Evaluation     Patient summary reviewed and Nursing notes reviewed   NPO Solid Status: > 6 hours  NPO Liquid Status: > 6 hours           Airway   Mallampati: II  TM distance: >3 FB  Neck ROM: full  No difficulty expected  Dental      Pulmonary - negative pulmonary ROS   Cardiovascular - negative cardio ROS        Neuro/Psych- negative ROS  GI/Hepatic/Renal/Endo - negative ROS     Musculoskeletal (-) negative ROS    Abdominal    Substance History - negative use     OB/GYN    (+) Pregnant,         Other                        Anesthesia Plan    ASA 2     epidural       Anesthetic plan, all risks, benefits, and alternatives have been provided, discussed and informed consent has been obtained with: patient.

## 2019-11-21 LAB
HCT VFR BLD AUTO: 35.9 % (ref 34–46.6)
HGB BLD-MCNC: 11.6 G/DL (ref 12–15.9)

## 2019-11-21 PROCEDURE — 99232 SBSQ HOSP IP/OBS MODERATE 35: CPT | Performed by: OBSTETRICS & GYNECOLOGY

## 2019-11-21 PROCEDURE — 85014 HEMATOCRIT: CPT | Performed by: OBSTETRICS & GYNECOLOGY

## 2019-11-21 PROCEDURE — 85018 HEMOGLOBIN: CPT | Performed by: OBSTETRICS & GYNECOLOGY

## 2019-11-21 RX ADMIN — DOCUSATE SODIUM 100 MG: 100 CAPSULE, LIQUID FILLED ORAL at 22:16

## 2019-11-21 RX ADMIN — IBUPROFEN 600 MG: 600 TABLET ORAL at 15:42

## 2019-11-21 RX ADMIN — IBUPROFEN 600 MG: 600 TABLET ORAL at 07:09

## 2019-11-21 RX ADMIN — IBUPROFEN 600 MG: 600 TABLET ORAL at 22:17

## 2019-11-21 NOTE — ANESTHESIA POSTPROCEDURE EVALUATION
Patient: Mendy Cote    Procedure Summary     Date:  11/20/19 Room / Location:      Anesthesia Start:  1025 Anesthesia Stop:  1422    Procedure:  LABOR ANALGESIA Diagnosis:      Scheduled Providers:   Provider:  Fish Rodriguez MD    Anesthesia Type:  epidural ASA Status:  2          Anesthesia Type: epidural  Last vitals  BP   111/65 (11/21/19 0700)   Temp   98.1 °F (36.7 °C) (11/21/19 0700)   Pulse   76 (11/21/19 0700)   Resp   16 (11/21/19 0700)     SpO2         Post Anesthesia Care and Evaluation    Patient location during evaluation: bedside  Patient participation: complete - patient participated  Level of consciousness: awake and alert  Pain management: adequate  Airway patency: patent  Anesthetic complications: No anesthetic complications    Cardiovascular status: acceptable  Respiratory status: acceptable  Hydration status: acceptable  Post Neuraxial Block status: Motor and sensory function returned to baseline and No signs or symptoms of PDPH

## 2019-11-21 NOTE — LACTATION NOTE
11/20/19 2000   Maternal Information   Date of Referral 11/20/19   Person Making Referral physician   Infant Reason for Referral NICU admission   Equipment Type   Breast Pump Type double electric, hospital grade   Breast Pump Flange Type hard   Breast Pump Flange Size 24 mm   Reproductive Interventions   Breastfeeding Assistance support offered   Breastfeeding Support encouragement provided;lactation counseling provided   Breast Pumping   Breast Pumping Interventions early pumping promoted;frequent pumping encouraged   Coping/Psychosocial Interventions   Parent/Child Attachment Promotion caring behavior modeled;positive reinforcement provided;strengths emphasized   Supportive Measures active listening utilized   Nursing initiated pumping while  in room. Used  nadeen to review milk supply, importance of pumping every 3 hours even when not getting milk, how to use breast pump and cleaning pump. Talked to RN and lactation will return tomorrow when  is in room to do additional teaching.

## 2019-11-21 NOTE — PROGRESS NOTES
Herber Cote  : 1990  MRN: [id number]  CSN: [id number]    Hospital Day: 2    Postpartum Day #1  Subjective     CC: hospital follow-up    Her pain is well controlled.  Vaginal bleeding is less than a normal period.       Objective     Min/max vitals past 24 hours:   Temp  Min: 97.7 °F (36.5 °C)  Max: 98.4 °F (36.9 °C)  BP  Min: 81/46  Max: 139/79  Pulse  Min: 56  Max: 110  Resp  Min: 16  Max: 18        Abdomen: soft, non-tender; bowel sounds normal; no masses   fundus firm and non-tender   Pelvic: deferred     Results from last 7 days   Lab Units 19  0854   WBC 10*3/mm3 13.56*   HEMOGLOBIN g/dL 12.2   HEMATOCRIT % 36.5   PLATELETS 10*3/mm3 166     Lab Results   Component Value Date    RH Negative 2019    HEPBSAG Negative 2019        Assessment   1. Postpartum Day #1 S/P vaginal delivery  2. Doing well     Plan   1. Continue routine postpartum care    Yfn Galvan MD  2019  6:55 AM

## 2019-11-21 NOTE — LACTATION NOTE
Infant remains in NICU. Mother continues to pump every 3hr for little to no yield. Instructed need for home pump once infant dc. Given and instructed rx. Also given amd instructed P4P.   Vietnamese  at bedside. Support/ encouragement/ education

## 2019-11-22 VITALS
DIASTOLIC BLOOD PRESSURE: 68 MMHG | BODY MASS INDEX: 28.3 KG/M2 | HEART RATE: 69 BPM | RESPIRATION RATE: 16 BRPM | TEMPERATURE: 98.3 F | WEIGHT: 149.91 LBS | SYSTOLIC BLOOD PRESSURE: 119 MMHG | HEIGHT: 61 IN

## 2019-11-22 PROCEDURE — 99238 HOSP IP/OBS DSCHRG MGMT 30/<: CPT | Performed by: OBSTETRICS & GYNECOLOGY

## 2019-11-22 RX ORDER — LANOLIN
CREAM (ML) TOPICAL AS NEEDED
Status: DISCONTINUED | OUTPATIENT
Start: 2019-11-22 | End: 2019-11-22 | Stop reason: HOSPADM

## 2019-11-22 RX ORDER — IBUPROFEN 600 MG/1
600 TABLET ORAL EVERY 6 HOURS PRN
Qty: 60 TABLET | Refills: 1 | Status: SHIPPED | OUTPATIENT
Start: 2019-11-22 | End: 2020-02-05

## 2019-11-22 RX ADMIN — DOCUSATE SODIUM 100 MG: 100 CAPSULE, LIQUID FILLED ORAL at 11:29

## 2019-11-22 RX ADMIN — IBUPROFEN 600 MG: 600 TABLET ORAL at 11:29

## 2019-11-22 RX ADMIN — Medication 1 APPLICATION: at 11:29

## 2019-11-22 NOTE — PROGRESS NOTES
PRANAV Cote  : 1990  MRN: [id number]  CSN: [id number]    Postpartum Day #2  Subjective   Her pain is well controlled.  Vaginal bleeding is less than a normal period. Baby had to go to the NICU for respiratory assistance.      Objective     Min/max vitals past 24 hours:   Temp  Min: 97.7 °F (36.5 °C)  Max: 98.3 °F (36.8 °C)  BP  Min: 111/65  Max: 119/68  Pulse  Min: 69  Max: 84  Resp  Min: 16  Max: 16        General: well developed; well nourished  no acute distress   Abdomen: fundus firm and non-tender   Pelvic: Not performed   Ext: Calves NT     Results from last 7 days   Lab Units 19  0823 19  0854   WBC 10*3/mm3  --  13.56*   HEMOGLOBIN g/dL 11.6* 12.2   HEMATOCRIT % 35.9 36.5   PLATELETS 10*3/mm3  --  166     Lab Results   Component Value Date    RH Negative 2019    HEPBSAG Negative 2019        Assessment   1. Postpartum Day #2 S/P vaginal delivery  2. Doing well     Plan   1. Discharge to home  2. Advised no tampons or intercourse for 6 weeks.  3. D/C questions all answered  4. Follow-up appointment in 6 week(s)    Margareth Contreras MD  2019  10:55 AM

## 2019-11-22 NOTE — DISCHARGE SUMMARY
Discharge Summary     Herber Cote  : 1990  MRN: [id number]  CSN: [id number]    Date of Admission: 2019   Date of Discharge:  2019   Delivering Physician: Yfn Galvan        Admission Diagnosis: 1. Pregnancy [Z34.90]   Discharge Diagnosis: 1. Same as above plus  2. Pregnancy at 39w6d - delivered       Procedures: 2019  - Vaginal, Spontaneous       Hospital Course  Patient is a 29 y.o.  who at 39w6d had a vaginal birth.  Her postpartum course was without complications.  On PPD #2 she was ready for discharge.  She had normal lochia and pain well controlled with oral medications.    Infant  male  fetus weighing 3500 g (7 lb 11.5 oz)   Apgars -  8  @ 1 minute /  9  @ 5 minutes.    Discharge labs  Lab Results   Component Value Date    WBC 13.56 (H) 2019    HGB 11.6 (L) 2019    HCT 35.9 2019     2019       Discharge Medications     Discharge Medications      New Medications      Instructions Start Date   benzocaine 20 % rectal ointment  Commonly known as:  AMERICAINE   1 application, Rectal, As Needed, Use up to 4 times a day as needed      benzocaine-lanolin-aloe vera 20-0.5 % aerosol topical spray  Commonly known as:  DERMOPLAST   Topical, As Needed, Use up to 4 times a day as needed      hydrocortisone 2.5 % rectal cream  Commonly known as:  ANUSOL-HC   1 application, Rectal, As Needed, Use up to 4 times a day as needed      ibuprofen 600 MG tablet  Commonly known as:  ADVIL,MOTRIN   600 mg, Oral, Every 6 Hours PRN      witch hazel-glycerin pad  Commonly known as:  TUCKS   1 each, Topical, As Needed, Use up to 4 times a day as needed         Continue These Medications      Instructions Start Date   ferrous sulfate 325 (65 FE) MG tablet   325 mg, Oral, 2 Times Daily Before Meals      prenatal vitamin 27-0.8 27-0.8 MG tablet tablet   Oral, Daily             Discharge Disposition Home or Self Care   Condition on Discharge: good    Follow-up: 6 weeks with Dr. Cole Contreras MD  11/22/2019

## 2019-11-22 NOTE — PAYOR COMM NOTE
"Mendy Elizabeth (29 y.o. Female)     Auth#[id number]    Discharged home 19    From: Luciana Davila  #[phone]  Fax#[phone]      Date of Birth Social Security Number Address Home Phone MRN    1990  [address] [place] [address]  [id number]    [employer] Marital Status          Mu-ism        Admission Date Admission Type Admitting Provider Attending Provider Department, Room/Bed    19 Elective Yfn Galvan MD  [facility] MOTHER BABY 4B, N424/1    Discharge Date Discharge Disposition Discharge Destination        2019 Home or Self Care              Attending Provider:  (none)   Allergies:  No Known Allergies    Isolation:  None   Infection:  None   Code Status:  CPR    Ht:  156 cm (61.42\")   Wt:  68 kg (149 lb 14.6 oz)    Admission Cmt:  None   Principal Problem:  Postpartum care following vaginal delivery 19 - [place]  [Z39.2]                 Active Insurance as of 2019     Primary Coverage     Payor Plan Insurance Group Employer/Plan Group    ANTHEM MEDICAID ANTH MEDICAID [id number]     Payor Plan Address Payor Plan Phone Number Payor Plan Fax Number Effective Dates    [address] [address] [phone]  2019 - None Entered    [facility] [id number]       Subscriber Name Subscriber Birth Date Member ID       MENDY ELIZABETH 1990 [id number]                 Emergency Contacts      (Rel.) Home Phone Work Phone Mobile Phone    CHEY ELIZABETH (Spouse) -- -- [phone]    Zulema Mccabe (Friend) -- -- [phone]               Discharge Summary      Margareth Contreras MD at 19 1057          Discharge Summary     Herber Elizabeth  : 1990  MRN: [id number]  CSN: [id number]    Date of Admission: 2019   Date of Discharge:  2019   Delivering Physician: Yfn Galvan        Admission Diagnosis: 1. Pregnancy [Z34.90]   Discharge Diagnosis: 1. Same as above " plus  2. Pregnancy at 39w6d - delivered       Procedures: 2019  - Vaginal, Spontaneous       Hospital Course  Patient is a 29 y.o.  who at 39w6d had a vaginal birth.  Her postpartum course was without complications.  On PPD #2 she was ready for discharge.  She had normal lochia and pain well controlled with oral medications.    Infant  male  fetus weighing 3500 g (7 lb 11.5 oz)   Apgars -  8  @ 1 minute /  9  @ 5 minutes.    Discharge labs  Lab Results   Component Value Date    WBC 13.56 (H) 2019    HGB 11.6 (L) 2019    HCT 35.9 2019     2019       Discharge Medications     Discharge Medications      New Medications      Instructions Start Date   benzocaine 20 % rectal ointment  Commonly known as:  AMERICAINE   1 application, Rectal, As Needed, Use up to 4 times a day as needed      benzocaine-lanolin-aloe vera 20-0.5 % aerosol topical spray  Commonly known as:  DERMOPLAST   Topical, As Needed, Use up to 4 times a day as needed      hydrocortisone 2.5 % rectal cream  Commonly known as:  ANUSOL-HC   1 application, Rectal, As Needed, Use up to 4 times a day as needed      ibuprofen 600 MG tablet  Commonly known as:  ADVIL,MOTRIN   600 mg, Oral, Every 6 Hours PRN      witch hazel-glycerin pad  Commonly known as:  TUCKS   1 each, Topical, As Needed, Use up to 4 times a day as needed         Continue These Medications      Instructions Start Date   ferrous sulfate 325 (65 FE) MG tablet   325 mg, Oral, 2 Times Daily Before Meals      prenatal vitamin 27-0.8 27-0.8 MG tablet tablet   Oral, Daily             Discharge Disposition Home or Self Care   Condition on Discharge: good   Follow-up: 6 weeks with Dr. Cole Contreras MD  2019      Electronically signed by Margareth Contreras MD at 19 5096

## 2020-01-03 ENCOUNTER — TELEPHONE (OUTPATIENT)
Dept: OBSTETRICS AND GYNECOLOGY | Facility: CLINIC | Age: 30
End: 2020-01-03

## 2020-02-03 NOTE — PROGRESS NOTES
Subjective   Chief Complaint   Patient presents with   • Postpartum Care     Mendy Cote is a 29 y.o. year old  presenting to be seen for her postpartum visit.  She had a vaginal delivery.  Her son is doing well.  History given through interpretor    Since delivery she has not been sexually active.  She does not have concerns about post-partum blues/depression.   She is breast feeding and plans to continues for 1 year(s).  For ongoing contraception, her plans are not using any form of contraception.    The following portions of the patient's history were reviewed and updated as appropriate:current medications and allergies    Social History    Tobacco Use      Smoking status: Never Smoker      Review of Systems  Constitutional POS: nothing reported    NEG: anorexia or night sweats   Genitourinary POS: nothing reported    NEG: dysuria or hematuria      Gastointestinal POS: nothing reported    NEG: bloating, change in bowel habits, melena or reflux symptoms   Breast POS: nothing reported    NEG: persistent breast lump, skin dimpling or nipple discharge        Objective   /74   Resp 14   Wt 57.2 kg (126 lb)   LMP 2019 (Approximate)   Breastfeeding Yes   BMI 23.49 kg/m²     General:  well developed; well nourished  no acute distress   Abdomen: soft, non-tender; no masses  no umbilical or inguinal hernias are present  no hepato-splenomegaly   Pelvis: Clinical staff was present for exam  External genitalia:  normal appearance of the external genitalia including Bartholin's and [place]'s glands.  :  urethral meatus normal;  Vaginal:  normal pink mucosa without prolapse or lesions.  Cervix:  normal appearance.  Uterus:  normal size, shape and consistency. fully involuted  Adnexa:  normal bimanual exam of the adnexa.          Assessment   1. Normal 6 week postpartum exam S/P vaginal delivery     Plan   1. BC options reviewed and compared today: none  2. The importance of keeping all planned  follow-up and taking all medications as prescribed was emphasized.  3. Follow up for annual exam 1 year         This note was electronically signed.    Yfn Galvan M.D.  February 5, 2020    Note: Speech recognition transcription software may have been used to create portions of this document.  An attempt at proofreading has been made but errors in transcription could still be present.

## 2020-02-05 ENCOUNTER — POSTPARTUM VISIT (OUTPATIENT)
Dept: OBSTETRICS AND GYNECOLOGY | Facility: CLINIC | Age: 30
End: 2020-02-05

## 2020-02-05 VITALS
DIASTOLIC BLOOD PRESSURE: 74 MMHG | WEIGHT: 126 LBS | BODY MASS INDEX: 23.49 KG/M2 | RESPIRATION RATE: 14 BRPM | SYSTOLIC BLOOD PRESSURE: 112 MMHG

## 2020-02-05 PROCEDURE — 99213 OFFICE O/P EST LOW 20 MIN: CPT | Performed by: OBSTETRICS & GYNECOLOGY

## 2022-08-12 ENCOUNTER — TRANSCRIBE ORDERS (OUTPATIENT)
Dept: LAB | Facility: HOSPITAL | Age: 32
End: 2022-08-12

## 2022-08-12 ENCOUNTER — LAB (OUTPATIENT)
Dept: LAB | Facility: HOSPITAL | Age: 32
End: 2022-08-12

## 2022-08-12 DIAGNOSIS — Z3A.13 13 WEEKS GESTATION OF PREGNANCY: Primary | ICD-10-CM

## 2022-08-12 DIAGNOSIS — Z3A.13 13 WEEKS GESTATION OF PREGNANCY: ICD-10-CM

## 2022-08-12 LAB
ABO GROUP BLD: NORMAL
BLD GP AB SCN SERPL QL: NEGATIVE
DEPRECATED RDW RBC AUTO: 37.4 FL (ref 37–54)
ERYTHROCYTE [DISTWIDTH] IN BLOOD BY AUTOMATED COUNT: 13.2 % (ref 12.3–15.4)
HCT VFR BLD AUTO: 37.5 % (ref 34–46.6)
HGB BLD-MCNC: 13.2 G/DL (ref 12–15.9)
MCH RBC QN AUTO: 28.3 PG (ref 26.6–33)
MCHC RBC AUTO-ENTMCNC: 35.2 G/DL (ref 31.5–35.7)
MCV RBC AUTO: 80.3 FL (ref 79–97)
PLATELET # BLD AUTO: 209 10*3/MM3 (ref 140–450)
PMV BLD AUTO: 11.5 FL (ref 6–12)
RBC # BLD AUTO: 4.67 10*6/MM3 (ref 3.77–5.28)
RH BLD: NEGATIVE
WBC NRBC COR # BLD: 8.91 10*3/MM3 (ref 3.4–10.8)

## 2022-08-12 PROCEDURE — 86803 HEPATITIS C AB TEST: CPT

## 2022-08-12 PROCEDURE — 86850 RBC ANTIBODY SCREEN: CPT

## 2022-08-12 PROCEDURE — 36415 COLL VENOUS BLD VENIPUNCTURE: CPT | Performed by: ADVANCED PRACTICE MIDWIFE

## 2022-08-12 PROCEDURE — G0432 EIA HIV-1/HIV-2 SCREEN: HCPCS

## 2022-08-12 PROCEDURE — 86900 BLOOD TYPING SEROLOGIC ABO: CPT

## 2022-08-12 PROCEDURE — 86780 TREPONEMA PALLIDUM: CPT

## 2022-08-12 PROCEDURE — 86762 RUBELLA ANTIBODY: CPT

## 2022-08-12 PROCEDURE — 87340 HEPATITIS B SURFACE AG IA: CPT

## 2022-08-12 PROCEDURE — 86787 VARICELLA-ZOSTER ANTIBODY: CPT

## 2022-08-12 PROCEDURE — 85027 COMPLETE CBC AUTOMATED: CPT | Performed by: ADVANCED PRACTICE MIDWIFE

## 2022-08-12 PROCEDURE — 86901 BLOOD TYPING SEROLOGIC RH(D): CPT

## 2022-08-13 LAB
HBV SURFACE AG SERPL QL IA: NORMAL
HCV AB SER DONR QL: NORMAL
HIV1+2 AB SER QL: NORMAL
RUBV IGG SERPL IA-ACNC: <0.9 INDEX
VZV IGG SER IA-ACNC: 440 INDEX

## 2022-08-14 LAB — TREPONEMA PALLIDUM IGG+IGM AB [PRESENCE] IN SERUM OR PLASMA BY IMMUNOASSAY: NON REACTIVE

## 2022-10-05 ENCOUNTER — APPOINTMENT (OUTPATIENT)
Dept: GENERAL RADIOLOGY | Facility: HOSPITAL | Age: 32
End: 2022-10-05

## 2022-10-05 ENCOUNTER — HOSPITAL ENCOUNTER (EMERGENCY)
Facility: HOSPITAL | Age: 32
Discharge: HOME | End: 2022-10-05
Attending: EMERGENCY MEDICINE | Admitting: EMERGENCY MEDICINE

## 2022-10-05 VITALS
RESPIRATION RATE: 16 BRPM | TEMPERATURE: 98 F | OXYGEN SATURATION: 98 % | HEIGHT: 61 IN | DIASTOLIC BLOOD PRESSURE: 71 MMHG | HEART RATE: 62 BPM | BODY MASS INDEX: 24.14 KG/M2 | SYSTOLIC BLOOD PRESSURE: 107 MMHG | WEIGHT: 127.87 LBS

## 2022-10-05 DIAGNOSIS — Z3A.21 21 WEEKS GESTATION OF PREGNANCY: ICD-10-CM

## 2022-10-05 DIAGNOSIS — S16.1XXA ACUTE STRAIN OF NECK MUSCLE, INITIAL ENCOUNTER: ICD-10-CM

## 2022-10-05 DIAGNOSIS — S40.012A CONTUSION OF LEFT SHOULDER, INITIAL ENCOUNTER: ICD-10-CM
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
## 2022-10-05 PROCEDURE — 72040 X-RAY EXAM NECK SPINE 2-3 VW: CPT

## 2022-10-05 PROCEDURE — 99284 EMERGENCY DEPT VISIT MOD MDM: CPT

## 2022-10-05 PROCEDURE — 73030 X-RAY EXAM OF SHOULDER: CPT

## 2022-10-05 PROCEDURE — G0378 HOSPITAL OBSERVATION PER HR: HCPCS
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
## 2022-10-06 ENCOUNTER — READMISSION MANAGEMENT (OUTPATIENT)
Dept: CALL CENTER | Facility: HOSPITAL | Age: 32
End: 2022-10-06

## 2022-10-07 ENCOUNTER — TRANSITIONAL CARE MANAGEMENT TELEPHONE ENCOUNTER (OUTPATIENT)
Dept: CALL CENTER | Facility: HOSPITAL | Age: 32
End: 2022-10-07
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
## 2022-10-21 ENCOUNTER — OFFICE VISIT (OUTPATIENT)
Dept: FAMILY MEDICINE CLINIC | Facility: CLINIC | Age: 32
End: 2022-10-21

## 2022-10-21 VITALS
BODY MASS INDEX: 23.67 KG/M2 | HEART RATE: 92 BPM | WEIGHT: 133.6 LBS | DIASTOLIC BLOOD PRESSURE: 62 MMHG | SYSTOLIC BLOOD PRESSURE: 98 MMHG | TEMPERATURE: 97.9 F | RESPIRATION RATE: 21 BRPM | HEIGHT: 63 IN | OXYGEN SATURATION: 98 %
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
## 2022-10-21 DIAGNOSIS — Z00.00 ANNUAL PHYSICAL EXAM: Primary | ICD-10-CM

## 2022-10-21 DIAGNOSIS — R10.12 LEFT UPPER QUADRANT PAIN: ICD-10-CM

## 2022-10-21 PROCEDURE — 99385 PREV VISIT NEW AGE 18-39: CPT | Performed by: STUDENT IN AN ORGANIZED HEALTH CARE EDUCATION/TRAINING PROGRAM

## 2022-10-21 PROCEDURE — 99213 OFFICE O/P EST LOW 20 MIN: CPT | Performed by: STUDENT IN AN ORGANIZED HEALTH CARE EDUCATION/TRAINING PROGRAM

## 2022-10-21 RX ORDER — FAMOTIDINE 20 MG/1
20 TABLET, FILM COATED ORAL 2 TIMES DAILY
Qty: 30 TABLET | Refills: 0 | Status: SHIPPED | OUTPATIENT
Start: 2022-10-21 | End: 2022-12-24 | Stop reason: HOSPADM

## 2022-10-21 NOTE — ASSESSMENT & PLAN NOTE
- Discussed diet and exercise: Discussed exercising at least 30 minutes a day 5 times per week, and eating a variety of fresh fruits, vegetables, and lean proteins and eating more complex carbohydrates  - Recommend annual dental and eye exams  - Pap smear up-to-date, reports last was 1-1/2 months ago and was normal  - Declines recommended COVID and flu vaccines today
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
## 2022-10-21 NOTE — PATIENT INSTRUCTIONS
Health Maintenance, Female  Adopting a healthy lifestyle and getting preventive care can go a long way to promote health and wellness. Talk with your health care provider about what schedule of regular examinations is right for you. This is a good chance for you to check in with your provider about disease prevention and staying healthy.  In between checkups, there are plenty of things you can do on your own. Experts have done a lot of research about which lifestyle changes and preventive measures are most likely to keep you healthy. Ask your health care provider for more information.  Weight and diet  Eat a healthy diet  Be sure to include plenty of vegetables, fruits, low-fat dairy products, and lean protein.  Do not eat a lot of foods high in solid fats, added sugars, or salt.  Get regular exercise. This is one of the most important things you can do for your health.  Most adults should exercise for at least 150 minutes each week. The exercise should increase your heart rate and make you sweat (moderate-intensity exercise).  Most adults should also do strengthening exercises at least twice a week. This is in addition to the moderate-intensity exercise.     Maintain a healthy weight  Body mass index (BMI) is a measurement that can be used to identify possible weight problems. It estimates body fat based on height and weight. Your health care provider can help determine your BMI and help you achieve or maintain a healthy weight.  For females 20 years of age and older:  A BMI below 18.5 is considered underweight.  A BMI of 18.5 to 24.9 is normal.  A BMI of 25 to 29.9 is considered overweight.  A BMI of 30 and above is considered obese.     Watch levels of cholesterol and blood lipids  You should start having your blood tested for lipids and cholesterol at 20 years of age, then have this test every 5 years.  You may need to have your cholesterol levels checked more often if:  Your lipid or cholesterol levels are  high.  You are older than 50 years of age.  You are at high risk for heart disease.     Cancer screening  Lung Cancer  Lung cancer screening is recommended for adults 55-80 years old who are at high risk for lung cancer because of a history of smoking.  A yearly low-dose CT scan of the lungs is recommended for people who:  Currently smoke.  Have quit within the past 15 years.  Have at least a 30-pack-year history of smoking. A pack year is smoking an average of one pack of cigarettes a day for 1 year.  Yearly screening should continue until it has been 15 years since you quit.  Yearly screening should stop if you develop a health problem that would prevent you from having lung cancer treatment.     Breast Cancer  Practice breast self-awareness. This means understanding how your breasts normally appear and feel.  It also means doing regular breast self-exams. Let your health care provider know about any changes, no matter how small.  If you are in your 20s or 30s, you should have a clinical breast exam (CBE) by a health care provider every 1-3 years as part of a regular health exam.  If you are 40 or older, have a CBE every year. Also consider having a breast X-ray (mammogram) every year.  If you have a family history of breast cancer, talk to your health care provider about genetic screening.  If you are at high risk for breast cancer, talk to your health care provider about having an MRI and a mammogram every year.  Breast cancer gene (BRCA) assessment is recommended for women who have family members with BRCA-related cancers. BRCA-related cancers include:  Breast.  Ovarian.  Tubal.  Peritoneal cancers.  Results of the assessment will determine the need for genetic counseling and BRCA1 and BRCA2 testing.     Cervical Cancer  Your health care provider may recommend that you be screened regularly for cancer of the pelvic organs (ovaries, uterus, and vagina). This screening involves a pelvic examination, including  checking for microscopic changes to the surface of your cervix (Pap test). You may be encouraged to have this screening done every 3 years, beginning at age 21.  For women ages 30-65, health care providers may recommend pelvic exams and Pap testing every 3 years, or they may recommend the Pap and pelvic exam, combined with testing for human papilloma virus (HPV), every 5 years. Some types of HPV increase your risk of cervical cancer. Testing for HPV may also be done on women of any age with unclear Pap test results.  Other health care providers may not recommend any screening for nonpregnant women who are considered low risk for pelvic cancer and who do not have symptoms. Ask your health care provider if a screening pelvic exam is right for you.  If you have had past treatment for cervical cancer or a condition that could lead to cancer, you need Pap tests and screening for cancer for at least 20 years after your treatment. If Pap tests have been discontinued, your risk factors (such as having a new sexual partner) need to be reassessed to determine if screening should resume. Some women have medical problems that increase the chance of getting cervical cancer. In these cases, your health care provider may recommend more frequent screening and Pap tests.     Colorectal Cancer  This type of cancer can be detected and often prevented.  Routine colorectal cancer screening usually begins at 50 years of age and continues through 75 years of age.  Your health care provider may recommend screening at an earlier age if you have risk factors for colon cancer.  Your health care provider may also recommend using home test kits to check for hidden blood in the stool.  A small camera at the end of a tube can be used to examine your colon directly (sigmoidoscopy or colonoscopy). This is done to check for the earliest forms of colorectal cancer.  Routine screening usually begins at age 50.  Direct examination of the colon should  be repeated every 5-10 years through 75 years of age. However, you may need to be screened more often if early forms of precancerous polyps or small growths are found.     Skin Cancer  Check your skin from head to toe regularly.  Tell your health care provider about any new moles or changes in moles, especially if there is a change in a mole's shape or color.  Also tell your health care provider if you have a mole that is larger than the size of a pencil eraser.  Always use sunscreen. Apply sunscreen liberally and repeatedly throughout the day.  Protect yourself by wearing long sleeves, pants, a wide-brimmed hat, and sunglasses whenever you are outside.     Heart disease, diabetes, and high blood pressure  High blood pressure causes heart disease and increases the risk of stroke. High blood pressure is more likely to develop in:  People who have blood pressure in the high end of the normal range (130-139/85-89 mm Hg).  People who are overweight or obese.  People who are .  If you are 18-39 years of age, have your blood pressure checked every 3-5 years. If you are 40 years of age or older, have your blood pressure checked every year. You should have your blood pressure measured twice--once when you are at a hospital or clinic, and once when you are not at a hospital or clinic. Record the average of the two measurements. To check your blood pressure when you are not at a hospital or clinic, you can use:  An automated blood pressure machine at a pharmacy.  A home blood pressure monitor.  If you are between 55 years and 79 years old, ask your health care provider if you should take aspirin to prevent strokes.  Have regular diabetes screenings. This involves taking a blood sample to check your fasting blood sugar level.  If you are at a normal weight and have a low risk for diabetes, have this test once every three years after 45 years of age.  If you are overweight and have a high risk for diabetes,  consider being tested at a younger age or more often.  Preventing infection  Hepatitis B  If you have a higher risk for hepatitis B, you should be screened for this virus. You are considered at high risk for hepatitis B if:  You were born in a country where hepatitis B is common. Ask your health care provider which countries are considered high risk.  Your parents were born in a high-risk country, and you have not been immunized against hepatitis B (hepatitis B vaccine).  You have HIV or AIDS.  You use needles to inject street drugs.  You live with someone who has hepatitis B.  You have had sex with someone who has hepatitis B.  You get hemodialysis treatment.  You take certain medicines for conditions, including cancer, organ transplantation, and autoimmune conditions.     Hepatitis C  Blood testing is recommended for:  Everyone born from 1945 through 1965.  Anyone with known risk factors for hepatitis C.     Sexually transmitted infections (STIs)  You should be screened for sexually transmitted infections (STIs) including gonorrhea and chlamydia if:  You are sexually active and are younger than 24 years of age.  You are older than 24 years of age and your health care provider tells you that you are at risk for this type of infection.  Your sexual activity has changed since you were last screened and you are at an increased risk for chlamydia or gonorrhea. Ask your health care provider if you are at risk.  If you do not have HIV, but are at risk, it may be recommended that you take a prescription medicine daily to prevent HIV infection. This is called pre-exposure prophylaxis (PrEP). You are considered at risk if:  You are sexually active and do not regularly use condoms or know the HIV status of your partner(s).  You take drugs by injection.  You are sexually active with a partner who has HIV.     Talk with your health care provider about whether you are at high risk of being infected with HIV. If you choose to  begin PrEP, you should first be tested for HIV. You should then be tested every 3 months for as long as you are taking PrEP.  Pregnancy  If you are premenopausal and you may become pregnant, ask your health care provider about preconception counseling.  If you may become pregnant, take 400 to 800 micrograms (mcg) of folic acid every day.  If you want to prevent pregnancy, talk to your health care provider about birth control (contraception).  Osteoporosis and menopause  Osteoporosis is a disease in which the bones lose minerals and strength with aging. This can result in serious bone fractures. Your risk for osteoporosis can be identified using a bone density scan.  If you are 65 years of age or older, or if you are at risk for osteoporosis and fractures, ask your health care provider if you should be screened.  Ask your health care provider whether you should take a calcium or vitamin D supplement to lower your risk for osteoporosis.  Menopause may have certain physical symptoms and risks.  Hormone replacement therapy may reduce some of these symptoms and risks.  Talk to your health care provider about whether hormone replacement therapy is right for you.  Follow these instructions at home:  Schedule regular health, dental, and eye exams.  Stay current with your immunizations.  Do not use any tobacco products including cigarettes, chewing tobacco, or electronic cigarettes.  If you are pregnant, do not drink alcohol.  If you are breastfeeding, limit how much and how often you drink alcohol.  Limit alcohol intake to no more than 1 drink per day for nonpregnant women. One drink equals 12 ounces of beer, 5 ounces of wine, or 1½ ounces of hard liquor.  Do not use street drugs.  Do not share needles.  Ask your health care provider for help if you need support or information about quitting drugs.  Tell your health care provider if you often feel depressed.  Tell your health care provider if you have ever been abused or do  not feel safe at home.  This information is not intended to replace advice given to you by your health care provider. Make sure you discuss any questions you have with your health care provider.  Document Released: 07/02/2012 Document Revised: 05/25/2017 Document Reviewed: 09/20/2016  [employer] Patient Education © 2018 [employer]

## 2022-10-21 NOTE — ASSESSMENT & PLAN NOTE
- Likely secondary to gastritis, has burning pain associated with spicy foods  - Can take Pepcid 20 mg twice daily as needed
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
## 2022-10-24 ENCOUNTER — LAB (OUTPATIENT)
Dept: LAB | Facility: HOSPITAL | Age: 32
End: 2022-10-24

## 2022-10-24 DIAGNOSIS — R10.12 LEFT UPPER QUADRANT PAIN: ICD-10-CM

## 2022-10-24 DIAGNOSIS — Z00.00 ANNUAL PHYSICAL EXAM: ICD-10-CM

## 2022-10-24 LAB
ALBUMIN SERPL-MCNC: 3.6 G/DL (ref 3.5–5.2)
ALBUMIN/GLOB SERPL: 1.5 G/DL
ALP SERPL-CCNC: 34 U/L (ref 39–117)
ALT SERPL W P-5'-P-CCNC: 9 U/L (ref 1–33)
ANION GAP SERPL CALCULATED.3IONS-SCNC: 10.5 MMOL/L (ref 5–15)
AST SERPL-CCNC: 14 U/L (ref 1–32)
BILIRUB SERPL-MCNC: 0.3 MG/DL (ref 0–1.2)
BUN SERPL-MCNC: 7 MG/DL (ref 6–20)
BUN/CREAT SERPL: 18.9 (ref 7–25)
CALCIUM SPEC-SCNC: 8.4 MG/DL (ref 8.6–10.5)
CHLORIDE SERPL-SCNC: 105 MMOL/L (ref 98–107)
CHOLEST SERPL-MCNC: 308 MG/DL (ref 0–200)
CO2 SERPL-SCNC: 22.5 MMOL/L (ref 22–29)
CREAT SERPL-MCNC: 0.37 MG/DL (ref 0.57–1)
EGFRCR SERPLBLD CKD-EPI 2021: 137.6 ML/MIN/1.73
GLOBULIN UR ELPH-MCNC: 2.4 GM/DL
GLUCOSE SERPL-MCNC: 74 MG/DL (ref 65–99)
HBA1C MFR BLD: 5 % (ref 4.8–5.6)
HDLC SERPL-MCNC: 81 MG/DL (ref 40–60)
LDLC SERPL CALC-MCNC: 198 MG/DL (ref 0–100)
LDLC/HDLC SERPL: 2.4 {RATIO}
LIPASE SERPL-CCNC: 33 U/L (ref 13–60)
POTASSIUM SERPL-SCNC: 4.1 MMOL/L (ref 3.5–5.2)
PROT SERPL-MCNC: 6 G/DL (ref 6–8.5)
SODIUM SERPL-SCNC: 138 MMOL/L (ref 136–145)
TRIGL SERPL-MCNC: 162 MG/DL (ref 0–150)
VLDLC SERPL-MCNC: 29 MG/DL (ref 5–40)

## 2022-10-24 PROCEDURE — 36415 COLL VENOUS BLD VENIPUNCTURE: CPT

## 2022-10-24 PROCEDURE — 83690 ASSAY OF LIPASE: CPT

## 2022-10-24 PROCEDURE — 80053 COMPREHEN METABOLIC PANEL: CPT

## 2022-10-24 PROCEDURE — 80061 LIPID PANEL: CPT

## 2022-10-24 PROCEDURE — 83036 HEMOGLOBIN GLYCOSYLATED A1C: CPT

## 2022-12-22 ENCOUNTER — HOSPITAL ENCOUNTER (INPATIENT)
Facility: HOSPITAL | Age: 32
LOS: 2 days | Discharge: HOME | End: 2022-12-24
Attending: OBSTETRICS & GYNECOLOGY | Admitting: OBSTETRICS & GYNECOLOGY
Payer: MEDICAID

## 2022-12-22 PROBLEM — O36.4XX0 IUFD AT 20 WEEKS OR MORE OF GESTATION: Status: ACTIVE | Noted: 2022-12-22

## 2022-12-22 LAB
ABO GROUP BLD: NORMAL
ABO GROUP BLD: NORMAL
BLD GP AB SCN SERPL QL: NEGATIVE
DEPRECATED RDW RBC AUTO: 40.3 FL (ref 37–54)
ERYTHROCYTE [DISTWIDTH] IN BLOOD BY AUTOMATED COUNT: 13.2 % (ref 12.3–15.4)
FIBRINOGEN PPP-MCNC: 308 MG/DL (ref 220–470)
HBA1C MFR BLD: 4.4 % (ref 4.8–5.6)
HCT VFR BLD AUTO: 31.4 % (ref 34–46.6)
HGB BLD-MCNC: 10.7 G/DL (ref 12–15.9)
MCH RBC QN AUTO: 28.8 PG (ref 26.6–33)
MCHC RBC AUTO-ENTMCNC: 34.1 G/DL (ref 31.5–35.7)
MCV RBC AUTO: 84.6 FL (ref 79–97)
PLATELET # BLD AUTO: 161 10*3/MM3 (ref 140–450)
PMV BLD AUTO: 11.2 FL (ref 6–12)
RBC # BLD AUTO: 3.71 10*6/MM3 (ref 3.77–5.28)
RH BLD: NEGATIVE
RH BLD: NEGATIVE
T&S EXPIRATION DATE: NORMAL
T4 FREE SERPL-MCNC: 0.84 NG/DL (ref 0.93–1.7)
TSH SERPL DL<=0.05 MIU/L-ACNC: 0.9 UIU/ML (ref 0.27–4.2)
WBC NRBC COR # BLD: 7.12 10*3/MM3 (ref 3.4–10.8)

## 2022-12-22 PROCEDURE — 84443 ASSAY THYROID STIM HORMONE: CPT | Performed by: OBSTETRICS & GYNECOLOGY

## 2022-12-22 PROCEDURE — 85613 RUSSELL VIPER VENOM DILUTED: CPT | Performed by: OBSTETRICS & GYNECOLOGY

## 2022-12-22 PROCEDURE — 25010000002 HYDROMORPHONE 1 MG/ML SOLUTION: Performed by: OBSTETRICS & GYNECOLOGY

## 2022-12-22 PROCEDURE — 25010000002 TERBUTALINE PER 1 MG: Performed by: OBSTETRICS & GYNECOLOGY

## 2022-12-22 PROCEDURE — 86592 SYPHILIS TEST NON-TREP QUAL: CPT | Performed by: OBSTETRICS & GYNECOLOGY

## 2022-12-22 PROCEDURE — 85027 COMPLETE CBC AUTOMATED: CPT | Performed by: OBSTETRICS & GYNECOLOGY

## 2022-12-22 PROCEDURE — 85732 THROMBOPLASTIN TIME PARTIAL: CPT | Performed by: OBSTETRICS & GYNECOLOGY

## 2022-12-22 PROCEDURE — 84439 ASSAY OF FREE THYROXINE: CPT | Performed by: OBSTETRICS & GYNECOLOGY

## 2022-12-22 PROCEDURE — 86900 BLOOD TYPING SEROLOGIC ABO: CPT | Performed by: OBSTETRICS & GYNECOLOGY

## 2022-12-22 PROCEDURE — 86644 CMV ANTIBODY: CPT | Performed by: OBSTETRICS & GYNECOLOGY

## 2022-12-22 PROCEDURE — 63710000001 PROMETHAZINE PER 12.5 MG: Performed by: OBSTETRICS & GYNECOLOGY

## 2022-12-22 PROCEDURE — 86147 CARDIOLIPIN ANTIBODY EA IG: CPT | Performed by: OBSTETRICS & GYNECOLOGY

## 2022-12-22 PROCEDURE — 86645 CMV ANTIBODY IGM: CPT | Performed by: OBSTETRICS & GYNECOLOGY

## 2022-12-22 PROCEDURE — 85670 THROMBIN TIME PLASMA: CPT | Performed by: OBSTETRICS & GYNECOLOGY

## 2022-12-22 PROCEDURE — 81241 F5 GENE: CPT | Performed by: OBSTETRICS & GYNECOLOGY

## 2022-12-22 PROCEDURE — 86850 RBC ANTIBODY SCREEN: CPT | Performed by: OBSTETRICS & GYNECOLOGY

## 2022-12-22 PROCEDURE — 25010000002 ONDANSETRON PER 1 MG: Performed by: OBSTETRICS & GYNECOLOGY

## 2022-12-22 PROCEDURE — 83036 HEMOGLOBIN GLYCOSYLATED A1C: CPT | Performed by: OBSTETRICS & GYNECOLOGY

## 2022-12-22 PROCEDURE — 10S0XZZ REPOSITION PRODUCTS OF CONCEPTION, EXTERNAL APPROACH: ICD-10-PCS | Performed by: OBSTETRICS & GYNECOLOGY

## 2022-12-22 PROCEDURE — 86747 PARVOVIRUS ANTIBODY: CPT | Performed by: OBSTETRICS & GYNECOLOGY

## 2022-12-22 PROCEDURE — 86901 BLOOD TYPING SEROLOGIC RH(D): CPT | Performed by: OBSTETRICS & GYNECOLOGY

## 2022-12-22 PROCEDURE — 85705 THROMBOPLASTIN INHIBITION: CPT | Performed by: OBSTETRICS & GYNECOLOGY

## 2022-12-22 PROCEDURE — 85384 FIBRINOGEN ACTIVITY: CPT | Performed by: OBSTETRICS & GYNECOLOGY

## 2022-12-22 RX ORDER — PROMETHAZINE HYDROCHLORIDE 12.5 MG/1
12.5 SUPPOSITORY RECTAL EVERY 6 HOURS PRN
Status: DISCONTINUED | OUTPATIENT
Start: 2022-12-22 | End: 2022-12-23

## 2022-12-22 RX ORDER — TERBUTALINE SULFATE 1 MG/ML
0.25 INJECTION, SOLUTION SUBCUTANEOUS AS NEEDED
Status: DISCONTINUED | OUTPATIENT
Start: 2022-12-22 | End: 2022-12-23

## 2022-12-22 RX ORDER — SODIUM CHLORIDE 0.9 % (FLUSH) 0.9 %
3-10 SYRINGE (ML) INJECTION AS NEEDED
Status: DISCONTINUED | OUTPATIENT
Start: 2022-12-22 | End: 2022-12-23

## 2022-12-22 RX ORDER — ONDANSETRON 2 MG/ML
4 INJECTION INTRAMUSCULAR; INTRAVENOUS EVERY 6 HOURS PRN
Status: DISCONTINUED | OUTPATIENT
Start: 2022-12-22 | End: 2022-12-23 | Stop reason: SDUPTHER

## 2022-12-22 RX ORDER — LIDOCAINE HYDROCHLORIDE 10 MG/ML
5 INJECTION, SOLUTION EPIDURAL; INFILTRATION; INTRACAUDAL; PERINEURAL AS NEEDED
Status: DISCONTINUED | OUTPATIENT
Start: 2022-12-22 | End: 2022-12-23

## 2022-12-22 RX ORDER — ACETAMINOPHEN 325 MG/1
650 TABLET ORAL EVERY 4 HOURS PRN
Status: DISCONTINUED | OUTPATIENT
Start: 2022-12-22 | End: 2022-12-23

## 2022-12-22 RX ORDER — OXYTOCIN/0.9 % SODIUM CHLORIDE 30/500 ML
2-30 PLASTIC BAG, INJECTION (ML) INTRAVENOUS
Status: DISCONTINUED | OUTPATIENT
Start: 2022-12-23 | End: 2022-12-23

## 2022-12-22 RX ORDER — ONDANSETRON 4 MG/1
4 TABLET, FILM COATED ORAL EVERY 6 HOURS PRN
Status: DISCONTINUED | OUTPATIENT
Start: 2022-12-22 | End: 2022-12-23 | Stop reason: SDUPTHER

## 2022-12-22 RX ORDER — MISOPROSTOL 100 MCG
25 TABLET ORAL
Status: ACTIVE | OUTPATIENT
Start: 2022-12-23 | End: 2022-12-23

## 2022-12-22 RX ORDER — BUTORPHANOL TARTRATE 1 MG/ML
1 INJECTION, SOLUTION INTRAMUSCULAR; INTRAVENOUS
Status: DISCONTINUED | OUTPATIENT
Start: 2022-12-22 | End: 2022-12-23

## 2022-12-22 RX ORDER — SODIUM CHLORIDE 0.9 % (FLUSH) 0.9 %
3 SYRINGE (ML) INJECTION EVERY 12 HOURS SCHEDULED
Status: DISCONTINUED | OUTPATIENT
Start: 2022-12-22 | End: 2022-12-23

## 2022-12-22 RX ORDER — SODIUM CHLORIDE, SODIUM LACTATE, POTASSIUM CHLORIDE, CALCIUM CHLORIDE 600; 310; 30; 20 MG/100ML; MG/100ML; MG/100ML; MG/100ML
125 INJECTION, SOLUTION INTRAVENOUS CONTINUOUS
Status: DISCONTINUED | OUTPATIENT
Start: 2022-12-22 | End: 2022-12-23

## 2022-12-22 RX ORDER — MISOPROSTOL 100 MCG
25 TABLET ORAL
Status: DISCONTINUED | OUTPATIENT
Start: 2022-12-22 | End: 2022-12-22

## 2022-12-22 RX ORDER — PROMETHAZINE HYDROCHLORIDE 12.5 MG/1
12.5 TABLET ORAL EVERY 6 HOURS PRN
Status: DISCONTINUED | OUTPATIENT
Start: 2022-12-22 | End: 2022-12-23

## 2022-12-22 RX ADMIN — HYDROMORPHONE HYDROCHLORIDE 1 MG: 1 INJECTION, SOLUTION INTRAMUSCULAR; INTRAVENOUS; SUBCUTANEOUS at 16:26

## 2022-12-22 RX ADMIN — Medication 25 MCG: at 17:13

## 2022-12-22 RX ADMIN — TERBUTALINE SULFATE 0.25 MG: 1 INJECTION, SOLUTION SUBCUTANEOUS at 16:21

## 2022-12-22 RX ADMIN — PROMETHAZINE HYDROCHLORIDE 12.5 MG: 12.5 TABLET ORAL at 21:09

## 2022-12-22 RX ADMIN — SODIUM CHLORIDE, POTASSIUM CHLORIDE, SODIUM LACTATE AND CALCIUM CHLORIDE 125 ML/HR: 600; 310; 30; 20 INJECTION, SOLUTION INTRAVENOUS at 14:54

## 2022-12-22 RX ADMIN — SODIUM CHLORIDE, POTASSIUM CHLORIDE, SODIUM LACTATE AND CALCIUM CHLORIDE 125 ML/HR: 600; 310; 30; 20 INJECTION, SOLUTION INTRAVENOUS at 22:14

## 2022-12-22 RX ADMIN — ONDANSETRON 4 MG: 2 INJECTION INTRAMUSCULAR; INTRAVENOUS at 18:10

## 2022-12-22 NOTE — H&P
[facility]  Obstetric History and Physical    Chief Complaint   Patient presents with   • External Cephalic Version       Subjective     Patient is a 32 y.o. female  currently at 32w3d, who presented for routine prenatal visit and was diagnosed with an IUFD.      Her prenatal care had been benign.  She declines rhogam and the GCT.  Her previous obstetric/gynecological history is noted for is non-contributory.    The following portions of the patients history were reviewed and updated as appropriate: current medications, allergies, past medical history, past surgical history, past family history, past social history and problem list .       Prenatal Information:   Maternal Prenatal Labs  Blood Type No results found for: ABO   Rh Status No results found for: RH   Antibody Screen No results found for: ABSCRN                     Past OB History:       OB History    Para Term  AB Living   8 4 4 0 3 4   SAB IAB Ectopic Molar Multiple Live Births   3 0 0 0 0 4      # Outcome Date GA Lbr Jae/2nd Weight Sex Delivery Anes PTL Lv   8 Current            7 Term 19 39w6d  3500 g (7 lb 11.5 oz) M Vag-Spont EPI N KARI      Complications: Fetal ventricular septal defect affecting antepartum care of mother      Name: Brandon       Apgar1: 8  Apgar5: 9   6 Term 17   2466 g (5 lb 7 oz) F Vag-Spont   KARI      Name: Brittaneyitta   5 2016           4 Term 05/17/15   2722 g (6 lb) F Vag-Spont   KARI      Name: Marielos   3 2014           2 Term 13   3175 g (7 lb) M Vag-Spont   KARI      Name: Eze   1 2011              Obstetric Comments   2013 - Eze   2015 - Marielos   2017 - Anitta   2019 - Brandon        Past Medical History: History reviewed. No pertinent past medical history.   Past Surgical History Past Surgical History:   Procedure Laterality Date   • [facility]   • D & C WITH SUCTION     • D & C WITH SUCTION        Family History: Family  History   Problem Relation Age of Onset   • Heart disease Father       Social History:  reports that she has never smoked. She has never used smokeless tobacco.   reports no history of alcohol use.   reports no history of drug use.        REVIEW OF SYSTEMS              Reports fetal movement is absent             Denies leakage of amniotic fluid.             Denies vaginal bleeding             She reports No contractions             All other systems reviewed and are negative    Objective       Vital Signs Range for the last 24 hours  Temperature: Temp:  [97.9 °F (36.6 °C)] 97.9 °F (36.6 °C)   Temp Source: Temp src: Oral   BP: BP: (109)/(65) 109/65   Pulse: Heart Rate:  [80] 80   Respirations: Resp:  [16] 16   SPO2:     O2 Amount (l/min):     O2 Devices     Weight: Weight:  [60.8 kg (134 lb)] 60.8 kg (134 lb)       Presentation: transverse   Cervix: Exam by:     Dilation:     Effacement:     Station:          Fetal Heart Rate Assessment   Method:     Beats/min:     Baseline:  absent   Varibility:     Accels:     Decels:     Tracing Category:       Uterine Assessment   Method: Method: none   Frequency (min):     Ctx Count in 10 min:     Duration:     Intensity:     Intensity by IUPC:     Resting Tone:     Resting Tone by IUPC:     [place] Units:       Constitutional:  Well developed, well nourished, no acute distress, well-groomed.   Respiratory:  Lungs are clear to auscultation bilaterally, normal breath sounds.   Cardiovascular:  Normal rate and rhythm, no murmurs.   Gastrointestinal:  Soft, gravid, nontender.  Uterus: Soft, nontender. Fundus appropriate for dates.  Neurologic:  Alert & oriented x 3,  no focal deficits noted.   Psychiatric:  Speech and behavior appropriate.   Extremities: no cyanosis, clubbing or edema, no evidence of DVT.        Labs:  Lab Results   Component Value Date    WBC 7.12 12/22/2022    HGB 10.7 (L) 12/22/2022    HCT 31.4 (L) 12/22/2022    MCV 84.6 12/22/2022     12/22/2022     AST 14 10/24/2022    ALT 9 10/24/2022               Assessment & Plan       IUFD at 20 weeks or more of gestation        Assessment:  1.  Intrauterine pregnancy at 32w3d weeks gestation with intrauterine fetal demise    2. Transverse presentation.  Prior  x 4    Plan:  1. IUFD labs.  Autopsy if patient desires.  Patient elects for ECV and then IOL.  Discussed PCS for delivery if unsuccessful.  Risk of placental abruption w/ ECV reviewed and possible need for emergent CS.  Terbutaline for uterine relaxation prior to ECV attempt.  2. Plan of care has been reviewed with patient and questions answered.  3.  Risks, benefits of treatment plan have been discussed.  4.  All questions have been answered.        Kamryn Webb MD  2022  15:50 EST

## 2022-12-22 NOTE — PROCEDURES
External Cephalic Version Procedure Note    Indications:   Fetus in transverse presentation    Pre-Procedure Diagnosis:  transvers presentation, fetal head on maternal left, spine anterior    Post-procedure Diagnosis:  vertex presentation    Procedure Details   The risks, benefits, complications, treatment options, and expected outcomes were discussed with the patient.  Risk of uterine rupture and placental abruption discussed.  Risk of potential need for emergent CS discussed.There was concurrence with the proposed plan, and informed consent was obtained. The patient was identified as Mendy Cote and the procedure verified as ECV. A time out was held and the above information confirmed.    She was given 0.25mg of terbutaline for uterine relaxation and 1mg of dilaudid for pain control.  Bedside US was performed with the findings noted above.  There was adequate fluid.  Placenta anterior. Dr. Busby called to assist.  The fetal buttocks was elevated and the fetal head was guided toward the pelvis with slow, persistent effort via a \"forward roll.\"  US confirmed vertex.       Blood Type:   B NEG    Rhogam: ordered    Condition:  stable    Complications:  None; patient tolerated the procedure well.      Kamryn Webb MD

## 2022-12-23 ENCOUNTER — ANESTHESIA EVENT (OUTPATIENT)
Dept: LABOR AND DELIVERY | Facility: HOSPITAL | Age: 32
End: 2022-12-23
Payer: MEDICAID

## 2022-12-23 ENCOUNTER — ANESTHESIA (OUTPATIENT)
Dept: LABOR AND DELIVERY | Facility: HOSPITAL | Age: 32
End: 2022-12-23
Payer: MEDICAID

## 2022-12-23 LAB
F5 GENE MUT ANL BLD/T: NORMAL
RPR SER QL: NORMAL

## 2022-12-23 PROCEDURE — 51703 INSERT BLADDER CATH COMPLEX: CPT

## 2022-12-23 PROCEDURE — 25010000002 ROPIVACAINE PER 1 MG: Performed by: ANESTHESIOLOGY

## 2022-12-23 PROCEDURE — C1755 CATHETER, INTRASPINAL: HCPCS

## 2022-12-23 PROCEDURE — 25010000002 FENTANYL CITRATE (PF) 50 MCG/ML SOLUTION: Performed by: ANESTHESIOLOGY

## 2022-12-23 PROCEDURE — C1755 CATHETER, INTRASPINAL: HCPCS | Performed by: ANESTHESIOLOGY

## 2022-12-23 PROCEDURE — 88307 TISSUE EXAM BY PATHOLOGIST: CPT | Performed by: OBSTETRICS & GYNECOLOGY

## 2022-12-23 PROCEDURE — 25010000002 BUTORPHANOL PER 1 MG: Performed by: OBSTETRICS & GYNECOLOGY

## 2022-12-23 PROCEDURE — 25010000002 OXYTOCIN PER 10 UNITS: Performed by: ADVANCED PRACTICE MIDWIFE

## 2022-12-23 PROCEDURE — 3E033VJ INTRODUCTION OF OTHER HORMONE INTO PERIPHERAL VEIN, PERCUTANEOUS APPROACH: ICD-10-PCS | Performed by: OBSTETRICS & GYNECOLOGY

## 2022-12-23 RX ORDER — DIPHENHYDRAMINE HYDROCHLORIDE 50 MG/ML
12.5 INJECTION INTRAMUSCULAR; INTRAVENOUS EVERY 8 HOURS PRN
Status: DISCONTINUED | OUTPATIENT
Start: 2022-12-23 | End: 2022-12-23

## 2022-12-23 RX ORDER — FENTANYL CITRATE 50 UG/ML
INJECTION, SOLUTION INTRAMUSCULAR; INTRAVENOUS AS NEEDED
Status: DISCONTINUED | OUTPATIENT
Start: 2022-12-23 | End: 2022-12-23 | Stop reason: SURG

## 2022-12-23 RX ORDER — EPHEDRINE SULFATE 5 MG/ML
INJECTION INTRAVENOUS
Status: DISCONTINUED
Start: 2022-12-23 | End: 2022-12-24 | Stop reason: HOSPADM

## 2022-12-23 RX ORDER — METHYLERGONOVINE MALEATE 0.2 MG/ML
200 INJECTION INTRAVENOUS ONCE AS NEEDED
Status: DISCONTINUED | OUTPATIENT
Start: 2022-12-23 | End: 2022-12-24 | Stop reason: HOSPADM

## 2022-12-23 RX ORDER — ONDANSETRON 2 MG/ML
4 INJECTION INTRAMUSCULAR; INTRAVENOUS EVERY 6 HOURS PRN
Status: DISCONTINUED | OUTPATIENT
Start: 2022-12-23 | End: 2022-12-24 | Stop reason: HOSPADM

## 2022-12-23 RX ORDER — SIMETHICONE 80 MG
80 TABLET,CHEWABLE ORAL 4 TIMES DAILY
Status: DISCONTINUED | OUTPATIENT
Start: 2022-12-23 | End: 2022-12-24 | Stop reason: HOSPADM

## 2022-12-23 RX ORDER — IBUPROFEN 600 MG/1
600 TABLET ORAL EVERY 6 HOURS PRN
Status: DISCONTINUED | OUTPATIENT
Start: 2022-12-23 | End: 2022-12-24 | Stop reason: HOSPADM

## 2022-12-23 RX ORDER — DIPHENHYDRAMINE HCL 25 MG
25 CAPSULE ORAL NIGHTLY PRN
Status: DISCONTINUED | OUTPATIENT
Start: 2022-12-23 | End: 2022-12-24 | Stop reason: HOSPADM

## 2022-12-23 RX ORDER — ONDANSETRON 4 MG/1
4 TABLET, FILM COATED ORAL EVERY 6 HOURS PRN
Status: DISCONTINUED | OUTPATIENT
Start: 2022-12-23 | End: 2022-12-24 | Stop reason: HOSPADM

## 2022-12-23 RX ORDER — EPHEDRINE SULFATE 5 MG/ML
10 INJECTION INTRAVENOUS
Status: DISCONTINUED | OUTPATIENT
Start: 2022-12-23 | End: 2022-12-23

## 2022-12-23 RX ORDER — OXYCODONE AND ACETAMINOPHEN 10; 325 MG/1; MG/1
2 TABLET ORAL EVERY 4 HOURS PRN
Status: DISCONTINUED | OUTPATIENT
Start: 2022-12-23 | End: 2022-12-24 | Stop reason: HOSPADM

## 2022-12-23 RX ORDER — ROPIVACAINE HYDROCHLORIDE 2 MG/ML
15 INJECTION, SOLUTION EPIDURAL; INFILTRATION; PERINEURAL CONTINUOUS
Status: DISCONTINUED | OUTPATIENT
Start: 2022-12-23 | End: 2022-12-23

## 2022-12-23 RX ORDER — CARBOPROST TROMETHAMINE 250 UG/ML
250 INJECTION, SOLUTION INTRAMUSCULAR AS NEEDED
Status: DISCONTINUED | OUTPATIENT
Start: 2022-12-23 | End: 2022-12-24 | Stop reason: HOSPADM

## 2022-12-23 RX ORDER — OXYTOCIN/0.9 % SODIUM CHLORIDE 30/500 ML
PLASTIC BAG, INJECTION (ML) INTRAVENOUS
Status: DISCONTINUED
Start: 2022-12-23 | End: 2022-12-24 | Stop reason: HOSPADM

## 2022-12-23 RX ORDER — ONDANSETRON 4 MG/1
4 TABLET, FILM COATED ORAL EVERY 8 HOURS PRN
Status: DISCONTINUED | OUTPATIENT
Start: 2022-12-23 | End: 2022-12-24 | Stop reason: HOSPADM

## 2022-12-23 RX ORDER — HYDROCORTISONE 25 MG/G
1 CREAM TOPICAL AS NEEDED
Status: DISCONTINUED | OUTPATIENT
Start: 2022-12-23 | End: 2022-12-24 | Stop reason: HOSPADM

## 2022-12-23 RX ORDER — BUPIVACAINE HYDROCHLORIDE 2.5 MG/ML
INJECTION, SOLUTION EPIDURAL; INFILTRATION; INTRACAUDAL AS NEEDED
Status: DISCONTINUED | OUTPATIENT
Start: 2022-12-23 | End: 2022-12-23 | Stop reason: SURG

## 2022-12-23 RX ORDER — ACETAMINOPHEN 325 MG/1
650 TABLET ORAL EVERY 4 HOURS PRN
Status: DISCONTINUED | OUTPATIENT
Start: 2022-12-23 | End: 2022-12-24 | Stop reason: HOSPADM

## 2022-12-23 RX ORDER — PRENATAL VIT/IRON FUM/FOLIC AC 27MG-0.8MG
1 TABLET ORAL DAILY
Status: DISCONTINUED | OUTPATIENT
Start: 2022-12-23 | End: 2022-12-24 | Stop reason: HOSPADM

## 2022-12-23 RX ORDER — OXYCODONE HYDROCHLORIDE AND ACETAMINOPHEN 5; 325 MG/1; MG/1
1 TABLET ORAL EVERY 4 HOURS PRN
Status: DISCONTINUED | OUTPATIENT
Start: 2022-12-23 | End: 2022-12-23

## 2022-12-23 RX ORDER — IBUPROFEN 600 MG/1
600 TABLET ORAL EVERY 6 HOURS PRN
Status: DISCONTINUED | OUTPATIENT
Start: 2022-12-23 | End: 2022-12-23

## 2022-12-23 RX ORDER — TRISODIUM CITRATE DIHYDRATE AND CITRIC ACID MONOHYDRATE 500; 334 MG/5ML; MG/5ML
30 SOLUTION ORAL ONCE
Status: DISCONTINUED | OUTPATIENT
Start: 2022-12-23 | End: 2022-12-23

## 2022-12-23 RX ORDER — LIDOCAINE HYDROCHLORIDE AND EPINEPHRINE 15; 5 MG/ML; UG/ML
INJECTION, SOLUTION EPIDURAL AS NEEDED
Status: DISCONTINUED | OUTPATIENT
Start: 2022-12-23 | End: 2022-12-23 | Stop reason: SURG

## 2022-12-23 RX ORDER — DOCUSATE SODIUM 100 MG/1
100 CAPSULE, LIQUID FILLED ORAL 2 TIMES DAILY
Status: DISCONTINUED | OUTPATIENT
Start: 2022-12-23 | End: 2022-12-24 | Stop reason: HOSPADM

## 2022-12-23 RX ORDER — FAMOTIDINE 10 MG/ML
20 INJECTION, SOLUTION INTRAVENOUS ONCE AS NEEDED
Status: DISCONTINUED | OUTPATIENT
Start: 2022-12-23 | End: 2022-12-23

## 2022-12-23 RX ORDER — CALCIUM CARBONATE 200(500)MG
1 TABLET,CHEWABLE ORAL 3 TIMES DAILY PRN
Status: DISCONTINUED | OUTPATIENT
Start: 2022-12-23 | End: 2022-12-24 | Stop reason: HOSPADM

## 2022-12-23 RX ORDER — MISOPROSTOL 200 UG/1
800 TABLET ORAL AS NEEDED
Status: DISCONTINUED | OUTPATIENT
Start: 2022-12-23 | End: 2022-12-24 | Stop reason: HOSPADM

## 2022-12-23 RX ORDER — SODIUM CHLORIDE 0.9 % (FLUSH) 0.9 %
1-10 SYRINGE (ML) INJECTION AS NEEDED
Status: DISCONTINUED | OUTPATIENT
Start: 2022-12-23 | End: 2022-12-24 | Stop reason: HOSPADM

## 2022-12-23 RX ORDER — OXYCODONE HYDROCHLORIDE AND ACETAMINOPHEN 5; 325 MG/1; MG/1
1 TABLET ORAL EVERY 4 HOURS PRN
Status: DISCONTINUED | OUTPATIENT
Start: 2022-12-23 | End: 2022-12-24 | Stop reason: HOSPADM

## 2022-12-23 RX ORDER — PROMETHAZINE HYDROCHLORIDE 12.5 MG/1
12.5 TABLET ORAL EVERY 4 HOURS PRN
Status: DISCONTINUED | OUTPATIENT
Start: 2022-12-23 | End: 2022-12-24 | Stop reason: HOSPADM

## 2022-12-23 RX ADMIN — FENTANYL CITRATE 100 MCG: 50 INJECTION, SOLUTION INTRAMUSCULAR; INTRAVENOUS at 10:34

## 2022-12-23 RX ADMIN — SODIUM CHLORIDE, POTASSIUM CHLORIDE, SODIUM LACTATE AND CALCIUM CHLORIDE 125 ML/HR: 600; 310; 30; 20 INJECTION, SOLUTION INTRAVENOUS at 06:03

## 2022-12-23 RX ADMIN — ACETAMINOPHEN 650 MG: 325 TABLET ORAL at 02:34

## 2022-12-23 RX ADMIN — BUTORPHANOL TARTRATE 2 MG: 2 INJECTION, SOLUTION INTRAMUSCULAR; INTRAVENOUS at 03:41

## 2022-12-23 RX ADMIN — IBUPROFEN 600 MG: 600 TABLET ORAL at 19:04

## 2022-12-23 RX ADMIN — FENTANYL CITRATE 100 MCG: 50 INJECTION, SOLUTION INTRAMUSCULAR; INTRAVENOUS at 11:11

## 2022-12-23 RX ADMIN — LIDOCAINE HYDROCHLORIDE AND EPINEPHRINE 3 ML: 15; 5 INJECTION, SOLUTION EPIDURAL at 10:45

## 2022-12-23 RX ADMIN — LIDOCAINE HYDROCHLORIDE AND EPINEPHRINE 2 ML: 15; 5 INJECTION, SOLUTION EPIDURAL at 10:46

## 2022-12-23 RX ADMIN — ROPIVACAINE HYDROCHLORIDE 15 ML/HR: 2 INJECTION, SOLUTION EPIDURAL; INFILTRATION at 10:52

## 2022-12-23 RX ADMIN — EPHEDRINE SULFATE 10 MG: 5 INJECTION INTRAVENOUS at 10:59

## 2022-12-23 RX ADMIN — OXYTOCIN 2 MILLI-UNITS/MIN: 10 INJECTION INTRAVENOUS at 02:28

## 2022-12-23 RX ADMIN — OXYCODONE HYDROCHLORIDE AND ACETAMINOPHEN 1 TABLET: 5; 325 TABLET ORAL at 20:10

## 2022-12-23 RX ADMIN — BUPIVACAINE HYDROCHLORIDE 12 ML: 2.5 INJECTION, SOLUTION EPIDURAL; INFILTRATION; INTRACAUDAL; PERINEURAL at 10:48

## 2022-12-23 NOTE — PROGRESS NOTES
Brief visit to introduce  services for emotional and spiritual support.  Patient became teary, stated, \"I just want to get it over with\" (using ).  Patient was appreciative of visit and support.   Will follow.

## 2022-12-23 NOTE — L&D DELIVERY NOTE
[facility]   Vaginal Delivery Note    Patient Name: Mendy Cote  : 1990  MRN: [id number]    Date of Delivery: 2022     Diagnosis     Pre & Post-Delivery:  Intrauterine pregnancy at 32w4d  Labor status:      IUFD at 20 weeks or more of gestation             Problem List    Transfer to Postpartum     Review the Delivery Report for details.     Delivery     Delivery: Vaginal, Breech     YOB: 2022    Time of Birth:  Gestational Age 4:34 PM   32w4d     Anesthesia: Epidural     Delivering clinician: Mehreen Cheung    Forceps?   No   Vacuum? No    Shoulder dystocia present: No        Delivery narrative: Patient progressed to complete and pushed effectively and delivered a female infant from the breech presentation.  Cord was clamped and cut.  Infant was handed to nursing staff per mother's request.  Placenta was delivered spontaneously quickly thereafter.  Minimal bleeding was noted.  Uterine tone was appropriate.  30 units of IV Pitocin was given.   No lacerations were noted.          Infant     Findings: child  infant     Infant observations: On examination infant was noted to be severely edematous, macerated and with sloughing skin.  General anatomy appeared normal.  Placenta was sent to pathology for full evaluation.     Apgars: 0  @ 1 minute /    0  @ 5 minutes   Infant Name:  Mis     Placenta & Cord         Placenta delivered  Spontaneous  at   2022  4:36 PM     Cord:   present.   Nuchal Cord?  no   Cord blood obtained: No    Cord gases obtained:  No              Repair      Episiotomy: None     No    Lacerations: No   Estimated Blood Loss:       Quantitative Blood Loss:  50 mL        Complications     none    Disposition     Mother to Remain in LD  in stable condition currently.    Mehreen Cheung MD  22  17:41 EST

## 2022-12-23 NOTE — ANESTHESIA PREPROCEDURE EVALUATION
Anesthesia Evaluation     Patient summary reviewed and Nursing notes reviewed   NPO Solid Status: > 6 hours  NPO Liquid Status: < 2 hours           Airway   Mallampati: I  TM distance: >3 FB  Neck ROM: full  No difficulty expected  Dental      Pulmonary - negative pulmonary ROS   Cardiovascular - negative cardio ROS        Neuro/Psych- negative ROS  GI/Hepatic/Renal/Endo - negative ROS     Musculoskeletal (-) negative ROS    Abdominal    Substance History - negative use     OB/GYN    (+) Pregnant,     Comment: IUFD      Other                        Anesthesia Plan    ASA 2     epidural       Anesthetic plan, risks, benefits, and alternatives have been provided, discussed and informed consent has been obtained with: patient.        CODE STATUS:    Level Of Support Discussed With: Patient  Code Status (Patient has no pulse and is not breathing): CPR (Attempt to Resuscitate)  Medical Interventions (Patient has pulse or is breathing): Full Support

## 2022-12-23 NOTE — PROGRESS NOTES
PRANAV [facility] Cote  : 1990  MRN: [id number]  CSN: [id number]    Labor progress note    Subjective   She reports no uterine contractions. She would like to hasten process along. Her sister and interprter are at [place]. .     Objective   Min/max vitals past 24 hours:  Temp  Min: 97.9 °F (36.6 °C)  Max: 98.1 °F (36.7 °C)   BP  Min: 92/63  Max: 115/57   Pulse  Min: 77  Max: 93   Resp  Min: 16  Max: 18        FHT's: NA   Cervix: was checked (by me): 1 cm / 20 % / -3   Contractions: irregular - external monitors used        Assessment   1. IUP at 32w3d  2. IUFD     Plan   1. Next dose of cytotec changed from PO to PV       Chicho Vargas CNM  2022  21:16 EST

## 2022-12-23 NOTE — PROGRESS NOTES
[facility]     Labor Progress Note    CC: Labor    IUP 32w4d, made really no change overnight with cytotec. Now on Pitocin. Fetal also verted to Breech presentation    Vitals:    12/23/22 0813   BP: 95/53   Pulse: 65   Resp: 16   Temp:            Uterine Assessment   Method: Method: palpation   Frequency (min): Contraction Frequency (Minutes): 1-3   Ctx Count in 10 min: Contractions in 10 Minutes:  (None, toco adjusted)   Duration:     Intensity: Contraction Intensity: strong by palpation   Intensity by IUPC:     Resting Tone: Uterine Resting Tone: soft by palpation   Resting Tone by IUPC:       Cervix: Exam by: Method: sterile exam per physician (Dr. Cheung)   Dilation: Cervical Dilation (cm): 2-3   Effacement: Cervical Effacement: 50%   Station: Fetal Station: -2        FB placed and instilled 60 mL fluid.   Unable to palpate presenting part.       Assessment: IUP 32w4d     Plan: Discussed FB and Pitocin. Epidural as needed/desired  If fetus remains breech/vertex will plan vaginal delivery. Will confirm position prior to AROM and will position appropriately.     Mehreen Cheung MD  08:57 EST  12/23/22

## 2022-12-23 NOTE — ANESTHESIA PROCEDURE NOTES
Labor Epidural      Patient reassessed immediately prior to procedure    Patient location during procedure: OB  Performed By  Anesthesiologist: Vidal Nava DO  Preanesthetic Checklist  Completed: patient identified, IV checked, site marked, risks and benefits discussed, surgical consent, monitors and equipment checked, pre-op evaluation and timeout performed  Additional Notes  Dural puncture performed with a 5 inch 25 g Gabriella needle, clear CSF.  Prep:  Pt Position:sitting  Sterile Tech:gloves, mask, sterile barrier and cap  Prep:chlorhexidine gluconate and isopropyl alcohol  Monitoring:blood pressure monitoring and continuous pulse oximetry  Epidural Block Procedure:  Approach:midline  Guidance:landmark technique and palpation technique  Location:L3-L4  Needle Type:Tuohy  Needle Gauge:17 G  Loss of Resistance Medium: air  Loss of Resistance: 4cm  Cath Depth at skin:9 cm  Paresthesia: none  Aspiration:negative  Test Dose:negative  Number of Attempts: 1  Post Assessment:  Dressing:secured with tape and occlusive dressing applied (Tegaderm Placed)  Pt Tolerance:patient tolerated the procedure well with no apparent complications  Complications:no

## 2022-12-23 NOTE — PROGRESS NOTES
[facility]     Labor Progress Note    CC: Labor    IUP 32w4d, feeling comfortable with epidural.     Vitals:    12/23/22 1503   BP: (!) 89/52   Pulse: 76   Resp:    Temp:            Uterine Assessment   Method: Method: external tocotransducer   Frequency (min): Contraction Frequency (Minutes): 1-3   Ctx Count in 10 min: Contractions in 10 Minutes:  (None, toco adjusted)   Duration:     Intensity: Contraction Intensity: strong by palpation   Intensity by IUPC:     Resting Tone: Uterine Resting Tone: soft by palpation   Resting Tone by IUPC:       Cervix: Exam by: Method: sterile exam per physician   Dilation: Cervical Dilation (cm): 7   Effacement: Cervical Effacement: 70%   Station: -1            Assessment: IUP 32w4d     Plan: Breech. Cont PPP, ok up to 30    Mehreen Cheung MD  15:22 EST  12/23/22

## 2022-12-23 NOTE — PAYOR COMM NOTE
Rocael Mendy (32 y.o. Female)     [place] Medicaid ID#[id number]    From: Luciana Davila LPN, Utilization Review  Phone #[phone]  Fax #[phone]      Date of Birth   1990    Social Security Number       Address   [place] [address]    Home Phone   [phone]    MRN   [id number]       Yarsanism   Shinto    Marital Status                               Admission Date   12/22/22    Admission Type   Elective    Admitting Provider   Kamryn Webb MD    Attending Provider   La Shultz CNM    Department, Room/Bed   [facility] LABOR DELIVERY, N302/1       Discharge Date       Discharge Disposition       Discharge Destination                               Attending Provider: La Shultz CNM    Allergies: No Known Allergies    Isolation: None   Infection: None   Code Status: CPR    Ht: 156 cm (61.42\")   Wt: 60.8 kg (134 lb)    Admission Cmt: None   Principal Problem: IUFD at 20 weeks or more of gestation [O36.4XX0]                 Active Insurance as of 12/22/2022     Primary Coverage     Payor Plan Insurance Group Employer/Plan Group    ANTHEM MEDICAID ANTHEM MEDICAID [id number]     Payor Plan Address Payor Plan Phone Number Payor Plan Fax Number Effective Dates    [address] [address] [phone]  8/1/2019 - None Entered    [facility] [id number]       Subscriber Name Subscriber Birth Date Member ID       MENDY ELIZABETH 1990 [id number]                 Emergency Contacts      (Rel.) Home Phone Work Phone Mobile Phone    JOHANNA ELIZABETH (Spouse) [phone] -- [phone]    Sasha Gutierrez (Friend) -- -- [phone]            Insurance Information                ANTHEM MEDICAID/ANTHEM MEDICAID Phone: [phone]    Subscriber: Mendy Elizabeth Subscriber#: [id number]    Group#: [id number] Precert#: --             History & Physical      Kamryn Webb MD at 12/22/22 1550            [place]  Obstetric History and Physical    Chief Complaint   Patient presents with   • External Cephalic Version       Subjective      Patient is a 32 y.o. female  currently at 32w3d, who presented for routine prenatal visit and was diagnosed with an IUFD.      Her prenatal care had been benign.  She declines rhogam and the GCT.  Her previous obstetric/gynecological history is noted for is non-contributory.    The following portions of the patients history were reviewed and updated as appropriate: current medications, allergies, past medical history, past surgical history, past family history, past social history and problem list .       Prenatal Information:   Maternal Prenatal Labs  Blood Type No results found for: ABO   Rh Status No results found for: RH   Antibody Screen No results found for: ABSCRN                     Past OB History:       OB History    Para Term  AB Living   8 4 4 0 3 4   SAB IAB Ectopic Molar Multiple Live Births   3 0 0 0 0 4      # Outcome Date GA Lbr Jae/2nd Weight Sex Delivery Anes PTL Lv   8 Current            7 Term 19 39w6d  3500 g (7 lb 11.5 oz) M Vag-Spont EPI N KARI      Complications: Fetal ventricular septal defect affecting antepartum care of mother      Name: Brandon       Apgar1: 8  Apgar5: 9   6 Term 17   2466 g (5 lb 7 oz) F Vag-Spont   KARI      Name: Anitta   5 2016           4 Term 05/17/15   2722 g (6 lb) F Vag-Spont   KARI      Name: Marielos   3 2014           2 Term 13   3175 g (7 lb) M Vag-Spont   KARI      Name: Eze   1 2011              Obstetric Comments   2013 - Eze   2015 - Marielos   2017 - Anitta   2019 - Brandon        Past Medical History: History reviewed. No pertinent past medical history.   Past Surgical History Past Surgical History:   Procedure Laterality Date   • [facility]   • [facility] & [facility] WITH SUCTION     • D & C WITH SUCTION        Family History: Family History    Problem Relation Age of Onset   • Heart disease Father       Social History:  reports that she has never smoked. She has never used smokeless tobacco.   reports no history of alcohol use.   reports no history of drug use.        REVIEW OF SYSTEMS              Reports fetal movement is absent             Denies leakage of amniotic fluid.             Denies vaginal bleeding             She reports No contractions             All other systems reviewed and are negative    Objective        Vital Signs Range for the last 24 hours  Temperature: Temp:  [97.9 °F (36.6 °C)] 97.9 °F (36.6 °C)   Temp Source: Temp src: Oral   BP: BP: (109)/(65) 109/65   Pulse: Heart Rate:  [80] 80   Respirations: Resp:  [16] 16   SPO2:     O2 Amount (l/min):     O2 Devices     Weight: Weight:  [60.8 kg (134 lb)] 60.8 kg (134 lb)       Presentation: transverse   Cervix: Exam by:     Dilation:     Effacement:     Station:          Fetal Heart Rate Assessment   Method:     Beats/min:     Baseline:  absent   Varibility:     Accels:     Decels:     Tracing Category:       Uterine Assessment   Method: Method: none   Frequency (min):     Ctx Count in 10 min:     Duration:     Intensity:     Intensity by IUPC:     Resting Tone:     Resting Tone by IUPC:     [place] Units:       Constitutional:  Well developed, well nourished, no acute distress, well-groomed.   Respiratory:  Lungs are clear to auscultation bilaterally, normal breath sounds.   Cardiovascular:  Normal rate and rhythm, no murmurs.   Gastrointestinal:  Soft, gravid, nontender.  Uterus: Soft, nontender. Fundus appropriate for dates.  Neurologic:  Alert & oriented x 3,  no focal deficits noted.   Psychiatric:  Speech and behavior appropriate.   Extremities: no cyanosis, clubbing or edema, no evidence of DVT.        Labs:  Lab Results   Component Value Date    WBC 7.12 12/22/2022    HGB 10.7 (L) 12/22/2022    HCT 31.4 (L) 12/22/2022    MCV 84.6 12/22/2022     12/22/2022    AST 14  10/24/2022    ALT 9 10/24/2022               Assessment & Plan       IUFD at 20 weeks or more of gestation        Assessment:  1.  Intrauterine pregnancy at 32w3d weeks gestation with intrauterine fetal demise    2. Transverse presentation.  Prior  x 4    Plan:  1. IUFD labs.  Autopsy if patient desires.  Patient elects for ECV and then IOL.  Discussed PCS for delivery if unsuccessful.  Risk of placental abruption w/ ECV reviewed and possible need for emergent CS.  Terbutaline for uterine relaxation prior to ECV attempt.  2. Plan of care has been reviewed with patient and questions answered.  3.  Risks, benefits of treatment plan have been discussed.  4.  All questions have been answered.        Kamryn Webb MD  2022  15:50 EST     Electronically signed by Kamryn Webb MD at 22 1553     H&P signed by [employer], [place] at 22 1510       [Media Unavailable] Scan on 2022 by [employer], [place]: PRENATAL RECORDS [facility] 22          Electronically signed by [employer], [place] at 22 1510     H&P signed by [employer], [place] at 22 1414       [Media Unavailable] Scan on 2022 by [employer], [place]: FLWSHEET [facility] 22          Electronically signed by [employer], [place] at 22 1414       Vital Signs (last 2 days)     Date/Time Temp Temp src Pulse Resp BP Patient Position    22 1403 -- -- 85 -- 103/57 --    22 1348 -- -- 81 -- 91/62 --    22 1333 -- -- 82 -- 99/57 --    22 1318 -- -- 75 -- 98/57 --    22 1303 -- -- 60 -- 101/55 --    22 1248 -- -- 76 -- 101/63 --    22 1233 98 (36.7) Oral 77 -- 99/57 --    22 1217 -- -- 78 -- 107/64 --    22 1203 -- -- 73 -- 106/62 --    22 1150 -- -- 82 -- 90/53 --    22 1133 -- -- 105 16 103/61 Lying    22 1119 -- -- 72 -- 99/55 --    22 1102 -- -- 81 -- 107/55 --    22 1059 -- -- 79 -- 82/52 --    22 1056 -- -- 88 -- 101/56 --     12/23/22 1050 -- -- 100 -- 110/64 --    12/23/22 0914 -- -- 68 -- 80/46 --    12/23/22 0813 -- -- 65 16 95/53 Lying    12/23/22 0746 97.5 (36.4) Oral -- -- -- --    12/23/22 0728 -- -- 85 16 131/80 Lying    12/23/22 0700 -- -- 66 18 82/50 Lying    12/23/22 0630 -- -- 62 -- 89/50 --    12/23/22 0600 98.1 (36.7) Oral 78 18 113/71 Lying    12/23/22 0530 -- -- 66 -- 91/55 --    12/23/22 0500 -- -- 64 -- 93/58 --    12/23/22 0400 -- -- 63 -- 93/50 --    12/23/22 0330 -- -- 48 -- 98/55 --    12/23/22 0300 -- -- 66 -- 101/54 --    12/23/22 0230 98 (36.7) Oral 77 18 111/65 Lying    12/22/22 2325 -- -- 76 -- 94/52 Lying    12/22/22 2310 -- -- 85 -- 103/55 --    12/22/22 2300 -- -- 84 -- 103/57 --    12/22/22 2250 -- -- 83 -- 90/54 --    12/22/22 2228 -- -- 88 -- 98/54 Lying    12/22/22 1955 98.1 (36.7) Oral 77 18 92/63 Sitting    12/22/22 1710 -- -- 93 16 115/57 Lying    12/22/22 1645 -- -- -- -- -- --    Comment rows:    OBSERV: External Cephalic version completed at bedside at 12/22/22 1645    12/22/22 1440 97.9 (36.6) Oral 80 16 109/65 Sitting          Facility-Administered Medications as of 12/23/2022   Medication Dose Route Frequency Provider Last Rate Last Admin   • acetaminophen (TYLENOL) tablet 650 mg  650 mg Oral Q4H PRN Mehreen Cheung MD   650 mg at 12/23/22 0234   • butorphanol (STADOL) injection 1 mg  1 mg Intravenous Q2H PRN Mehreen Cheung MD       • butorphanol (STADOL) injection 2 mg  2 mg Intravenous Q2H PRN Mehreen Cheung MD   2 mg at 12/23/22 0341   • diphenhydrAMINE (BENADRYL) injection 12.5 mg  12.5 mg Intravenous Q8H PRN Vidal Nava DO       • ePHEDrine Sulfate 5 MG/ML injection  - ADS Override Pull            • ePHEDrine Sulfate 5 MG/ML injection 10 mg  10 mg Intravenous Q10 Min PRN Vidal Nava DO   10 mg at 12/23/22 1059   • famotidine (PEPCID) injection 20 mg  20 mg Intravenous Once PRN Vidal Nava DO       • HYDROmorphone (DILAUDID) injection 1 mg  1 mg Intravenous Q2H  PRN Kamryn Webb MD   1 mg at 22 1626   • lactated ringers bolus 1,000 mL  1,000 mL Intravenous Once Mehreen Cheung MD       • lactated ringers bolus 1,000 mL  1,000 mL Intravenous PRN Vidal Nava DO       • lactated ringers infusion  125 mL/hr Intravenous Continuous Mehreen Cheung  mL/hr at 22 0710 999 mL/hr at 22 0710   • lidocaine PF 1% (XYLOCAINE) injection 5 mL  5 mL Intradermal PRN Mehreen Cheung MD       • [] miSOPROStol (CYTOTEC) split tablet 25 mcg  25 mcg Vaginal Q4H Chicho Vargas CNM       • ondansetron (ZOFRAN) tablet 4 mg  4 mg Oral Q6H PRN Mehreen Cheung MD        Or   • ondansetron (ZOFRAN) injection 4 mg  4 mg Intravenous Q6H PRN Mehreen Cheung MD   4 mg at 22 1810   • oxytocin (PITOCIN) 30 units in 0.9% sodium chloride 500 mL (premix)  2-24 annie-units/min Intravenous Titrated Chicho Vargas CNM 16 mL/hr at 22 1350 16 annie-units/min at 22 1350   • promethazine (PHENERGAN) suppository 12.5 mg  12.5 mg Rectal Q6H PRN Mehreen Cheung MD        Or   • promethazine (PHENERGAN) tablet 12.5 mg  12.5 mg Oral Q6H PRN Mehreen Cheung MD   12.5 mg at 22 2109   • Rho D Immune Globulin (RHOPHYLAC) injection 1,500 Units  1,500 Units Intramuscular Once Kamryn Webb MD       • ropivacaine (NAROPIN) 0.2 % injection  15 mL/hr Epidural Continuous Vidal Nava DO 15 mL/hr at 22 1052 15 mL/hr at 22 1052   • Sod Citrate-Citric Acid (BICITRA) solution 30 mL  30 mL Oral Once Vidal Nava DO       • sodium chloride 0.9 % flush 3 mL  3 mL Intravenous Q12H Mehreen Cheung MD       • sodium chloride 0.9 % flush 3-10 mL  3-10 mL Intravenous PRN Mehreen Cheung MD       • terbutaline (BRETHINE) injection 0.25 mg  0.25 mg Subcutaneous PRN Mehreen Cheung MD   0.25 mg at 22 1621     Lab Results (last 48 hours)     Procedure Component Value Units Date/Time    RPR [[id number]]  (Normal) Collected:  12/22/22 1517    Specimen: Blood Updated: 12/23/22 1431     RPR Non-Reactive    Factor 5 Leiden [[id number]]  (Normal) Collected: 12/22/22 1517    Specimen: Blood Updated: 12/23/22 1015     Factor V Leiden Normal    Narrative:      Factor V Leiden is a specific mutation (R506Q) in the factor V gene that is associated with an increased risk of venous thrombosis.  Factor V Leiden is more resistant to inactivation by activated protein C.  Factor V Leiden refers to the G to A transition at nucleotide position 1691 of the Factor V gene, resulting in the substitution of the amino acid arginine by glutamine in the Factor V protein, causing resistance to cleavage by Activated Protein C (APC).    As a result, factor V persists in the circulation leading to a mild hyper-coagulable state.  The Leiden mutation accounts for 90% - 95% of APC resistance.  Factor V Leiden has been reported in patients with deep vein thrombosis, pulmonary embolus, central retinal vein occlusion, cerebral sinus thrombosis and hepatic vein thrombosis.  Other risk factors to be considered in the workup for venous thrombosis include the [id number] mutation in the factor II (prothrombin) gene, protein S and C deficiency, and antithrombin deficiencies. Anticardiolipin antibody and lupus anticoagulant analysis may be appropriate for certain patients, as well as homocysteine levels.    The expression of Factor V Leiden thrombophilia is impacted by coexisting genetic thrombophilic disorders, acquired thrombophilic disorders (malignancy, hyperhomocysteinemia, high factor VIII levels), and circumstances including: pregnancy, oral contraceptive use, hormone replacement therapy, selective estrogen receptor modulators, travel, central venous catheters, surgery, transplantation and advanced age.    In order to derive the most meaningful benefit from this testing, it may be necessary that the results and subsequent options from these complex genetic tests be discussed  with patients by a trained genetic professional.    Fibrinogen [[id number]]  (Normal) Collected: 12/22/22 1839    Specimen: Blood Updated: 12/22/22 1915     Fibrinogen 308 mg/dL     T4, Free [[id number]]  (Abnormal) Collected: 12/22/22 1517    Specimen: Blood Updated: 12/22/22 1608     Free T4 0.84 ng/dL     Narrative:      Results may be falsely increased if patient taking Biotin.      TSH [[id number]]  (Normal) Collected: 12/22/22 1517    Specimen: Blood Updated: 12/22/22 1608     TSH 0.904 uIU/mL     Hemoglobin A1c [[id number]]  (Abnormal) Collected: 12/22/22 1517    Specimen: Blood Updated: 12/22/22 1556     Hemoglobin A1C 4.40 %     Narrative:      Hemoglobin A1C Ranges:    Increased Risk for Diabetes  5.7% to 6.4%  Diabetes                     >= 6.5%  Diabetic Goal                < 7.0%    Parvovirus B19 Antibody, IgG & IgM [[id number]] Collected: 12/22/22 1517    Specimen: Blood Updated: 12/22/22 1537    Cardiolipin Antibody [[id number]] Collected: 12/22/22 1517    Specimen: Blood Updated: 12/22/22 1537    Lupus Anticoagulant [[id number]] Collected: 12/22/22 1517    Specimen: Blood Updated: 12/22/22 1537    Cytomegalovirus Antibody, IgG [[id number]] Collected: 12/22/22 1517    Specimen: Blood Updated: 12/22/22 1537    Cytomegalovirus Antibody, IgM [[id number]] Collected: 12/22/22 1517    Specimen: Blood Updated: 12/22/22 1537    CBC (No Diff) [[id number]]  (Abnormal) Collected: 12/22/22 1517    Specimen: Blood Updated: 12/22/22 1535     WBC 7.12 10*3/mm3      RBC 3.71 10*6/mm3      Hemoglobin 10.7 g/dL      Hematocrit 31.4 %      MCV 84.6 fL      MCH 28.8 pg      MCHC 34.1 g/dL      RDW 13.2 %      RDW-SD 40.3 fl      MPV 11.2 fL      Platelets 161 10*3/mm3           Imaging Results (Last 48 Hours)     ** No results found for the last 48 hours. **          Orders (last 48 hrs)      Start     Ordered    12/23/22 1115  ropivacaine (NAROPIN) 0.2 % injection  Continuous         12/23/22 1025    12/23/22 1115  Sod  Citrate-Citric Acid (BICITRA) solution 30 mL  Once         22 1025    22 1025  Vital Signs Per Anesthesia Guidelines  Per Hospital Policy        Comments: Every 3 Minutes x 20 Minutes following epidural dosing, then if stable every 15 Minutes    22 1025    22 1025  Start IV (16 or 18 Gauge)  Once         22 1025    22 1025  Fetal Heart Rate Monitor  Once         22 1025    22 1025  Nurse or Anesthesiologist to Remain With Patient for 15 Minutes Following Dosing  Continuous         22 1025    22 1025  Facilitate Maternal Position on Side & Maintain Uterine Displacement  Continuous         22 1025    22 1025  Consult Anesthesia Prior to Changing Epidural Infusion / Rate  Continuous         22 1025    22 1025  famotidine (PEPCID) injection 20 mg  Once As Needed         22 1025    22 1024  lactated ringers bolus 1,000 mL  As Needed         22 1025    22 1024  ePHEDrine Sulfate 5 MG/ML injection 10 mg  Every 10 Minutes PRN         22 1025    22 1024  diphenhydrAMINE (BENADRYL) injection 12.5 mg  Every 8 Hours PRN         22 1025    22 1007  ePHEDrine Sulfate 5 MG/ML injection  - ADS Override Pull        Note to Pharmacy: Created by cabinet override    22 1007    22 0230  oxytocin (PITOCIN) 30 units in 0.9% sodium chloride 500 mL (premix)  Titrated         22 2300    22 0000  miSOPROStol (CYTOTEC) split tablet 25 mcg  Every 4 Hours Scheduled         22 2115    22 2000  miSOPROStol (CYTOTEC) split tablet 25 mcg  Every 4 Hours Scheduled,   Status:  Discontinued         22 1702    22 1802   RhIg Evaluation  STAT        See Hyperspace for full Linked Orders Report.    22 1802    22 1802  Doses of Rh Immune Globulin  Once,   Status:  Canceled        See Hyperspace for full Linked Orders Report.    22 1802    22 1801   Fibrinogen  STAT         12/22/22 1800    12/22/22 1800  Rho D Immune Globulin (RHOPHYLAC) injection 1,500 Units  Once         12/22/22 1713    12/22/22 1652  Fibrinogen  Once,   Status:  Canceled         12/22/22 1652    12/22/22 1539  HYDROmorphone (DILAUDID) injection 1 mg  Every 2 Hours PRN         12/22/22 1539    12/22/22 1453  CBC (No Diff)  STAT         12/22/22 1453    12/22/22 1453  Type & Screen  STAT         12/22/22 1453    12/22/22 1415  sodium chloride 0.9 % flush 3 mL  Every 12 Hours Scheduled         12/22/22 1317    12/22/22 1415  lactated ringers bolus 1,000 mL  Once         12/22/22 1317    12/22/22 1415  lactated ringers infusion  Continuous         12/22/22 1317    12/22/22 1320  RPR  Once         12/22/22 1319    12/22/22 1319  Cytomegalovirus Antibody, IgG  Once         12/22/22 1319    12/22/22 1319  Hemoglobin A1c  Once         12/22/22 1319    12/22/22 1319  Lupus Anticoagulant  Once         12/22/22 1319    12/22/22 1319  Cardiolipin Antibody  Once         12/22/22 1319    12/22/22 1319  TSH  Once         12/22/22 1319    12/22/22 1319  T4, Free  Once         12/22/22 1319    12/22/22 1319  Factor 5 Leiden  Once         12/22/22 1319    12/22/22 1319  Parvovirus B19 Antibody, IgG & IgM  Once         12/22/22 1319    12/22/22 1319  Cytomegalovirus Antibody, IgM  Once         12/22/22 1319    12/22/22 1318  Notify Provider if Membranes Ruptured, Bleeding Greater Than 1 Pad Per Hour, Fetal Heart Tone Abnormality or Severe Pain  Until Discontinued         12/22/22 1319    12/22/22 1318  Inpatient Spiritual Care Consult  Once        Provider:  (Not yet assigned)    12/22/22 1319    12/22/22 1318  Place Sequential Compression Device  Once         12/22/22 1319    12/22/22 1318  Maintain Sequential Compression Device  Continuous         12/22/22 1319    12/22/22 1316  Admit To Obstetrics Inpatient  Once         12/22/22 1317    12/22/22 1316  Code Status and Medical Interventions:  Continuous          12/22/22 1317    12/22/22 1316  Obtain informed consent  Once         12/22/22 1317    12/22/22 1316  Place Sequential Compression Device  Once         12/22/22 1317    12/22/22 1316  Maintain Sequential Compression Device  Continuous         12/22/22 1317    12/22/22 1316  Vital Signs Per hospital policy  Per Hospital Policy         12/22/22 1317    12/22/22 1316  External Uterine Contraction Monitoring  Per Hospital Policy         12/22/22 1317    12/22/22 1316  Notify Physician (specified)  Until Discontinued         12/22/22 1317    12/22/22 1316  Notify physician for tachysystole (per hospital algorithm)  Until Discontinued         12/22/22 1317    12/22/22 1316  Notify physician if membranes ruptured, bleeding greater than 1 pad an hour, fetal heart tone abnormality, and severe pain  Until Discontinued         12/22/22 1317    12/22/22 1316  Up Ad Ericka  Until Discontinued         12/22/22 1317    12/22/22 1316  Cervical Exam  Once        Comments: Unless contraindicated, every 1-2 hours in active labor, or at nurse's discretion.    12/22/22 1317    12/22/22 1316  Assess Need for Indwelling Urinary Catheter - Follow Removal Protocol  Continuous        Comments: Indwelling Urinary Catheter Removal Criteria  Discontinue Indwelling Urinary Catheter Unless One of the Following is Present:  Urinary Retention or Obstruction  Chronic Urinary Catheter Use  End of Life  Critical Illness with Strict I/O   Tract or Abdominal Surgery  Stage 3/4 Sacral / Perineal Wound  Required Activity Restriction: Trauma  Required Activity Restriction: Spine Surgery  If Patient is Being Followed by Urology Contact Them PRIOR to Removal  Do Not Remove Indwelling Urinary Catheter Order is Present with a CLINICAL REASON to Maintain the Catheter. Provider is Required to Include a Clinical Reason to Maintain a Urinary Catheter    Patient Admitted With Indwelling Urinary Catheter (Not Placed [facility])  Assess for Continued  Need & Document Medical Necessity  If Infection is Suspected, Contact the Provider       See Hyperspace for full Linked Orders Report.    12/22/22 1317    12/22/22 1316  Urinary Catheter Care  Every Shift      See Hyperspace for full Linked Orders Report.    12/22/22 1317    12/22/22 1316  NPO Diet NPO Type: Ice Chips  Diet Effective Now         12/22/22 1317    12/22/22 1316  CBC (No Diff)  Once,   Status:  Canceled         12/22/22 1317    12/22/22 1316  Type & Screen  Once,   Status:  Canceled         12/22/22 1317    12/22/22 1316  Insert Peripheral IV  Once         12/22/22 1317    12/22/22 1316  Saline Lock & Maintain IV Access  Continuous         12/22/22 1317    12/22/22 1315  butorphanol (STADOL) injection 1 mg  Every 2 Hours PRN         12/22/22 1317    12/22/22 1315  butorphanol (STADOL) injection 2 mg  Every 2 Hours PRN         12/22/22 1317    12/22/22 1315  ondansetron (ZOFRAN) tablet 4 mg  Every 6 Hours PRN        See Hyperspace for full Linked Orders Report.    12/22/22 1317    12/22/22 1315  ondansetron (ZOFRAN) injection 4 mg  Every 6 Hours PRN        See Hyperspace for full Linked Orders Report.    12/22/22 1317    12/22/22 1315  promethazine (PHENERGAN) suppository 12.5 mg  Every 6 Hours PRN        See Hyperspace for full Linked Orders Report.    12/22/22 1317    12/22/22 1315  promethazine (PHENERGAN) tablet 12.5 mg  Every 6 Hours PRN        See Hyperspace for full Linked Orders Report.    12/22/22 1317    12/22/22 1315  terbutaline (BRETHINE) injection 0.25 mg  As Needed         12/22/22 1317    12/22/22 1315  lidocaine PF 1% (XYLOCAINE) injection 5 mL  As Needed         12/22/22 1317    12/22/22 1315  sodium chloride 0.9 % flush 3-10 mL  As Needed         12/22/22 1317    12/22/22 1315  acetaminophen (TYLENOL) tablet 650 mg  Every 4 Hours PRN         12/22/22 1317    Unscheduled  Insert Indwelling Urinary Catheter  As Needed        Comments: EPIDURAL PLACEMENT   See Hyperspace for full Linked  Orders Report.    12/22/22 8690    Signed and Held  Notify Physician (specified)  Until Discontinued         Signed and Held    Signed and Held  Vital Signs Per hospital policy  Per Hospital Policy         Signed and Held    Signed and Held  Up with Assistance  As Needed         Signed and Held    Signed and Held  Fundal & Lochia Check  Per Order Details      Comments: Every 15 Minutes x4, Then Every 30 Minutes x2, Then Every Shift    Signed and Held    Signed and Held  Fundal & Lochia Check  Every Shift       Signed and Held    Signed and Held  Apply Ice to Perineum  As Needed         Signed and Held    Signed and Held  Bladder Assessment  As Needed         Signed and Held    Signed and Held  Diet: Regular/House Diet; Texture: Regular Texture (IDDSI 7); Fluid Consistency: Thin (IDDSI 0)  Diet Effective Now         Signed and Held    Signed and Held  acetaminophen (TYLENOL) tablet 650 mg  Every 4 Hours PRN         Signed and Held    Signed and Held  ibuprofen (ADVIL,MOTRIN) tablet 600 mg  Every 6 Hours PRN         Signed and Held    Signed and Held  oxyCODONE-acetaminophen (PERCOCET) 5-325 MG per tablet 1 tablet  Every 4 Hours PRN         Signed and Held    Signed and Held  oxyCODONE-acetaminophen (PERCOCET)  MG per tablet 2 tablet  Every 4 Hours PRN         Signed and Held    Signed and Held  methylergonovine (METHERGINE) injection 200 mcg  Once As Needed         Signed and Held    Signed and Held  carboprost (HEMABATE) injection 250 mcg  As Needed         Signed and Held    Signed and Held  miSOPROStol (CYTOTEC) tablet 800 mcg  As Needed         Signed and Held    Signed and Held  ondansetron (ZOFRAN) tablet 4 mg  Every 6 Hours PRN        See Hyperspace for full Linked Orders Report.    Signed and Held    Signed and Held  ondansetron (ZOFRAN) injection 4 mg  Every 6 Hours PRN        See Hyperspace for full Linked Orders Report.    Signed and Held    Signed and Held  Nurse May Remove Epidural Catheter After  Delivery  Continuous         Signed and Held    Signed and Held  Transfer to postpartum when discharge criteria met.  Until Discontinued         Signed and Held                 Physician Progress Notes (last 48 hours)      Mehreen Cheung MD at 12/23/22 0857          [facility]     Labor Progress Note    CC: Labor    IUP 32w4d, made really no change overnight with cytotec. Now on Pitocin. Fetal also verted to Breech presentation    Vitals:    12/23/22 0813   BP: 95/53   Pulse: 65   Resp: 16   Temp:            Uterine Assessment   Method: Method: palpation   Frequency (min): Contraction Frequency (Minutes): 1-3   Ctx Count in 10 min: Contractions in 10 Minutes:  (None, toco adjusted)   Duration:     Intensity: Contraction Intensity: strong by palpation   Intensity by IUPC:     Resting Tone: Uterine Resting Tone: soft by palpation   Resting Tone by IUPC:       Cervix: Exam by: Method: sterile exam per physician (Dr. Cheung)   Dilation: Cervical Dilation (cm): 2-3   Effacement: Cervical Effacement: 50%   Station: Fetal Station: -2        FB placed and instilled 60 mL fluid.   Unable to palpate presenting part.       Assessment: IUP 32w4d     Plan: Discussed FB and Pitocin. Epidural as needed/desired  If fetus remains breech/vertex will plan vaginal delivery. Will confirm position prior to AROM and will position appropriately.     Mehreen Cheung MD  08:57 EST  12/23/22      Electronically signed by Mehreen Cheung MD at 12/23/22 0859

## 2022-12-24 VITALS
HEIGHT: 61 IN | RESPIRATION RATE: 16 BRPM | WEIGHT: 134 LBS | HEART RATE: 83 BPM | SYSTOLIC BLOOD PRESSURE: 122 MMHG | DIASTOLIC BLOOD PRESSURE: 59 MMHG | TEMPERATURE: 97.5 F | BODY MASS INDEX: 25.3 KG/M2

## 2022-12-24 LAB
CARDIOLIPIN IGA SER IA-ACNC: <9 APL U/ML (ref 0–11)
CARDIOLIPIN IGG SER IA-ACNC: <9 GPL U/ML (ref 0–14)
CARDIOLIPIN IGM SER IA-ACNC: <9 MPL U/ML (ref 0–12)
CMV IGG SERPL IA-ACNC: 3.2 U/ML (ref 0–0.59)
CMV IGM SERPL IA-ACNC: <30 AU/ML (ref 0–29.9)

## 2022-12-24 RX ORDER — HYDROXYZINE PAMOATE 25 MG/1
25-50 CAPSULE ORAL 3 TIMES DAILY PRN
Qty: 30 CAPSULE | Refills: 0 | Status: SHIPPED | OUTPATIENT
Start: 2022-12-24

## 2022-12-24 RX ORDER — IBUPROFEN 600 MG/1
600 TABLET ORAL EVERY 6 HOURS PRN
Qty: 60 TABLET | Refills: 0 | Status: SHIPPED | OUTPATIENT
Start: 2022-12-24

## 2022-12-24 RX ORDER — PSEUDOEPHEDRINE HCL 30 MG
100 TABLET ORAL 2 TIMES DAILY
Qty: 60 CAPSULE | Refills: 0 | Status: SHIPPED | OUTPATIENT
Start: 2022-12-24

## 2022-12-24 RX ADMIN — DOCUSATE SODIUM 100 MG: 100 CAPSULE, LIQUID FILLED ORAL at 09:07

## 2022-12-24 RX ADMIN — IBUPROFEN 600 MG: 600 TABLET ORAL at 02:03

## 2022-12-24 RX ADMIN — MAGNESIUM HYDROXIDE 10 ML: 2400 SUSPENSION ORAL at 09:07

## 2022-12-24 RX ADMIN — IBUPROFEN 600 MG: 600 TABLET ORAL at 08:57

## 2022-12-24 NOTE — PROGRESS NOTES
[facility]  Obstetric Progress Note    Chief Complaint: PPD 1    Subjective     was able to sleep some overnight. Pain controlled mild cramping. lakia diet. +amb/void. Lochia appr.  Desires discharge  Objective     Vital Signs Range for the last 24 hours  Temp:  [97.5 °F (36.4 °C)-98.8 °F (37.1 °C)] 98 °F (36.7 °C)   BP: ()/(46-80) 106/59   Heart Rate:  [] 68   Resp:  [16-18] 18           Device (Oxygen Therapy): room air       Intake/Output last 24 hours:      Intake/Output Summary (Last 24 hours) at 2022 0643  Last data filed at 2022 1625  Gross per 24 hour   Intake 500 ml   Output 700 ml   Net -200 ml       Intake/Output this shift:    No intake/output data recorded.    Physical Exam:  General: No acute distress   Heart RRR   Lungs CTAB     Abdomen Soft, non tender, fundus firm   Extremities Exam of extremities: no pedal edema noted       Laboratory Results:  Pending this morning      Assessment/Plan: PPD 1 s/p  of IUFD  1.RPPC: Advance care  2.  Labs for IUFD normal so far, will follow-up placental pathology and remaining labs  3.  Rh neg, non-isoimmunized: Refuses RhoGAM  4.  Desires DC home today  5.  Bereavement: Arrangements have been made          Mehreen Cheung MD  2022  06:43 EST

## 2022-12-24 NOTE — ANESTHESIA POSTPROCEDURE EVALUATION
Patient: Mendy Cote    Procedure Summary     Date: 12/23/22 Room / Location:     Anesthesia Start: 1033 Anesthesia Stop: 1636    Procedure: LABOR ANALGESIA Diagnosis:     Scheduled Providers:  Provider: Vidal Nava DO    Anesthesia Type: epidural ASA Status: 2          Anesthesia Type: epidural    Vitals  Vitals Value Taken Time   /59 12/24/22 0809   Temp 97.5 °F (36.4 °C) 12/24/22 0809   Pulse 83 12/24/22 0809   Resp 16 12/24/22 0809   SpO2             Post Anesthesia Care and Evaluation    Patient location during evaluation: bedside  Patient participation: complete - patient participated  Level of consciousness: awake and awake and alert  Pain score: 0  Pain management: satisfactory to patient    Airway patency: patent  Anesthetic complications: No anesthetic complications  PONV Status: none  Cardiovascular status: acceptable, hemodynamically stable and stable  Respiratory status: acceptable  Hydration status: stable  Post Neuraxial Block status: Motor and sensory function returned to baseline and No signs or symptoms of PDPH
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
## 2022-12-26 LAB
APTT SCREEN TO CONFIRM RATIO: 0.95 RATIO (ref 0–1.34)
CONFIRM APTT/NORMAL: 33.2 SEC (ref 0–47.6)
LA 2 SCREEN W REFLEX-IMP: NORMAL
SCREEN APTT: 32.8 SEC (ref 0–51.9)
SCREEN DRVVT: 31.8 SEC (ref 0–47)
THROMBIN TIME: 14.6 SEC (ref 0–23)

## 2022-12-28 LAB
CYTO UR: NORMAL
LAB AP CASE REPORT: NORMAL
LAB AP CLINICAL INFORMATION: NORMAL
LAB AP DIAGNOSIS COMMENT: NORMAL
PATH REPORT.FINAL DX SPEC: NORMAL
PATH REPORT.GROSS SPEC: NORMAL

## 2022-12-30 LAB
B19V IGG SER IA-ACNC: 1.3 INDEX (ref 0–0.8)
B19V IGM SER IA-ACNC: 0.1 INDEX (ref 0–0.8)

## 2023-01-12 ENCOUNTER — TRANSCRIBE ORDERS (OUTPATIENT)
Dept: LAB | Facility: HOSPITAL | Age: 33
End: 2023-01-12
Payer: COMMERCIAL

## 2023-01-12 ENCOUNTER — LAB (OUTPATIENT)
Dept: LAB | Facility: HOSPITAL | Age: 33
End: 2023-01-12
Payer: MEDICAID

## 2023-01-12 DIAGNOSIS — R30.0 DYSURIA: Primary | ICD-10-CM

## 2023-01-12 DIAGNOSIS — R30.0 DYSURIA: ICD-10-CM

## 2023-01-12 PROCEDURE — 87086 URINE CULTURE/COLONY COUNT: CPT

## 2023-01-12 PROCEDURE — 87147 CULTURE TYPE IMMUNOLOGIC: CPT

## 2023-01-14 LAB — BACTERIA SPEC AEROBE CULT: ABNORMAL

## 2023-01-23 ENCOUNTER — TELEPHONE (OUTPATIENT)
Dept: LABOR AND DELIVERY | Facility: HOSPITAL | Age: 33
End: 2023-01-23
Payer: COMMERCIAL

## 2023-02-15 ENCOUNTER — TELEPHONE (OUTPATIENT)
Dept: LABOR AND DELIVERY | Facility: HOSPITAL | Age: 33
End: 2023-02-15
Payer: COMMERCIAL

## 2023-02-15 NOTE — TELEPHONE ENCOUNTER
Called via [employer]  ID # [phone]. No answer on phone and voicemail not set up. Will attempt to contact at later time/day.

## 2023-03-16 ENCOUNTER — TRANSCRIBE ORDERS (OUTPATIENT)
Dept: ADMINISTRATIVE | Facility: HOSPITAL | Age: 33
End: 2023-03-16
Payer: COMMERCIAL

## 2023-03-16 DIAGNOSIS — N64.4 BREAST PAIN: Primary | ICD-10-CM

## 2023-05-01 ENCOUNTER — HOSPITAL ENCOUNTER (OUTPATIENT)
Dept: MAMMOGRAPHY | Facility: HOSPITAL | Age: 33
Discharge: HOME | End: 2023-05-01
Admitting: NURSE PRACTITIONER
Payer: MEDICAID

## 2023-05-01 ENCOUNTER — TRANSCRIBE ORDERS (OUTPATIENT)
Dept: MAMMOGRAPHY | Facility: HOSPITAL | Age: 33
End: 2023-05-01
Payer: COMMERCIAL

## 2023-05-01 DIAGNOSIS — R92.8 ABNORMAL MAMMOGRAM: Primary | ICD-10-CM

## 2023-05-01 DIAGNOSIS — N64.4 BREAST PAIN: ICD-10-CM

## 2023-05-01 PROCEDURE — 77066 DX MAMMO INCL CAD BI: CPT | Performed by: RADIOLOGY

## 2023-05-01 PROCEDURE — G0279 TOMOSYNTHESIS, MAMMO: HCPCS

## 2023-05-01 PROCEDURE — 77066 DX MAMMO INCL CAD BI: CPT

## 2023-05-01 PROCEDURE — 77062 BREAST TOMOSYNTHESIS BI: CPT | Performed by: RADIOLOGY

## 2023-06-14 ENCOUNTER — HOSPITAL ENCOUNTER (OUTPATIENT)
Dept: MAMMOGRAPHY | Facility: HOSPITAL | Age: 33
Discharge: HOME | End: 2023-06-14
Payer: MEDICAID

## 2023-06-14 DIAGNOSIS — R92.8 ABNORMAL MAMMOGRAM: ICD-10-CM

## 2023-06-14 PROCEDURE — 76098 X-RAY EXAM SURGICAL SPECIMEN: CPT

## 2023-06-14 PROCEDURE — 88305 TISSUE EXAM BY PATHOLOGIST: CPT | Performed by: RADIOLOGY

## 2023-06-14 PROCEDURE — 0 LIDOCAINE 1 % SOLUTION: Performed by: RADIOLOGY

## 2023-06-14 PROCEDURE — A4648 IMPLANTABLE TISSUE MARKER: HCPCS

## 2023-06-14 RX ORDER — LIDOCAINE HYDROCHLORIDE AND EPINEPHRINE 10; 10 MG/ML; UG/ML
10 INJECTION, SOLUTION INFILTRATION; PERINEURAL ONCE
Status: COMPLETED | OUTPATIENT
Start: 2023-06-14 | End: 2023-06-14

## 2023-06-14 RX ORDER — LIDOCAINE HYDROCHLORIDE 10 MG/ML
5 INJECTION, SOLUTION INFILTRATION; PERINEURAL ONCE
Status: COMPLETED | OUTPATIENT
Start: 2023-06-14 | End: 2023-06-14

## 2023-06-14 RX ADMIN — Medication 5 ML: at 13:48

## 2023-06-14 RX ADMIN — LIDOCAINE HYDROCHLORIDE,EPINEPHRINE BITARTRATE 10 ML: 10; .01 INJECTION, SOLUTION INFILTRATION; PERINEURAL at 13:54

## 2023-06-14 NOTE — PROGRESS NOTES
Alert and orientated. Denies discomfort, no active bleeding, steri-strips not visualized, gauze dressing intact. Cold packs given. Verbalizes and demonstrates understanding of post-care instructions, written copy given.  present for post care instructions.

## 2023-06-16 ENCOUNTER — TELEPHONE (OUTPATIENT)
Dept: MAMMOGRAPHY | Facility: HOSPITAL | Age: 33
End: 2023-06-16
Payer: COMMERCIAL

## 2023-06-16 LAB
CYTO UR: NORMAL
LAB AP CASE REPORT: NORMAL
LAB AP CLINICAL INFORMATION: NORMAL
PATH REPORT.FINAL DX SPEC: NORMAL
PATH REPORT.GROSS SPEC: NORMAL

## 2023-06-16 NOTE — TELEPHONE ENCOUNTER
[facility]ia [employer] , patient notified of pathology results and recommendation. Verbalizes understanding. Denies discomfort. Denies signs and symptoms of infection. Questions answered, support given, verbalized understanding.

## 2023-10-23 ENCOUNTER — OFFICE VISIT (OUTPATIENT)
Dept: FAMILY MEDICINE CLINIC | Facility: CLINIC | Age: 33
End: 2023-10-23
Payer: MEDICAID

## 2023-10-23 ENCOUNTER — LAB (OUTPATIENT)
Dept: LAB | Facility: HOSPITAL | Age: 33
End: 2023-10-23
Payer: MEDICAID

## 2023-10-23 VITALS
WEIGHT: 135.8 LBS | OXYGEN SATURATION: 98 % | DIASTOLIC BLOOD PRESSURE: 72 MMHG | HEIGHT: 61 IN | BODY MASS INDEX: 25.64 KG/M2 | SYSTOLIC BLOOD PRESSURE: 108 MMHG | TEMPERATURE: 98.4 F | HEART RATE: 84 BPM

## 2023-10-23 DIAGNOSIS — R10.9 ABDOMINAL PAIN, UNSPECIFIED ABDOMINAL LOCATION: ICD-10-CM

## 2023-10-23 DIAGNOSIS — E78.5 HYPERLIPIDEMIA, UNSPECIFIED HYPERLIPIDEMIA TYPE: ICD-10-CM

## 2023-10-23 DIAGNOSIS — Z00.00 ANNUAL PHYSICAL EXAM: Primary | ICD-10-CM

## 2023-10-23 DIAGNOSIS — Z00.00 ANNUAL PHYSICAL EXAM: ICD-10-CM

## 2023-10-23 LAB
ALBUMIN SERPL-MCNC: 4.4 G/DL (ref 3.5–5.2)
ALBUMIN/GLOB SERPL: 1.8 G/DL
ALP SERPL-CCNC: 34 U/L (ref 39–117)
ALT SERPL W P-5'-P-CCNC: 8 U/L (ref 1–33)
ANION GAP SERPL CALCULATED.3IONS-SCNC: 9.4 MMOL/L (ref 5–15)
AST SERPL-CCNC: 13 U/L (ref 1–32)
BASOPHILS # BLD AUTO: 0.04 10*3/MM3 (ref 0–0.2)
BASOPHILS NFR BLD AUTO: 0.5 % (ref 0–1.5)
BILIRUB SERPL-MCNC: 0.4 MG/DL (ref 0–1.2)
BUN SERPL-MCNC: 10 MG/DL (ref 6–20)
BUN/CREAT SERPL: 17.5 (ref 7–25)
CALCIUM SPEC-SCNC: 9 MG/DL (ref 8.6–10.5)
CHLORIDE SERPL-SCNC: 104 MMOL/L (ref 98–107)
CHOLEST SERPL-MCNC: 248 MG/DL (ref 0–200)
CO2 SERPL-SCNC: 24.6 MMOL/L (ref 22–29)
CREAT SERPL-MCNC: 0.57 MG/DL (ref 0.57–1)
DEPRECATED RDW RBC AUTO: 37.2 FL (ref 37–54)
EGFRCR SERPLBLD CKD-EPI 2021: 123.2 ML/MIN/1.73
EOSINOPHIL # BLD AUTO: 0.18 10*3/MM3 (ref 0–0.4)
EOSINOPHIL NFR BLD AUTO: 2.4 % (ref 0.3–6.2)
ERYTHROCYTE [DISTWIDTH] IN BLOOD BY AUTOMATED COUNT: 12.6 % (ref 12.3–15.4)
GLOBULIN UR ELPH-MCNC: 2.4 GM/DL
GLUCOSE SERPL-MCNC: 88 MG/DL (ref 65–99)
HCT VFR BLD AUTO: 41.8 % (ref 34–46.6)
HDLC SERPL-MCNC: 57 MG/DL (ref 40–60)
HGB BLD-MCNC: 13.9 G/DL (ref 12–15.9)
IMM GRANULOCYTES # BLD AUTO: 0.01 10*3/MM3 (ref 0–0.05)
IMM GRANULOCYTES NFR BLD AUTO: 0.1 % (ref 0–0.5)
LDLC SERPL CALC-MCNC: 178 MG/DL (ref 0–100)
LDLC/HDLC SERPL: 3.08 {RATIO}
LYMPHOCYTES # BLD AUTO: 2.47 10*3/MM3 (ref 0.7–3.1)
LYMPHOCYTES NFR BLD AUTO: 33 % (ref 19.6–45.3)
MCH RBC QN AUTO: 27.4 PG (ref 26.6–33)
MCHC RBC AUTO-ENTMCNC: 33.3 G/DL (ref 31.5–35.7)
MCV RBC AUTO: 82.4 FL (ref 79–97)
MONOCYTES # BLD AUTO: 0.54 10*3/MM3 (ref 0.1–0.9)
MONOCYTES NFR BLD AUTO: 7.2 % (ref 5–12)
NEUTROPHILS NFR BLD AUTO: 4.25 10*3/MM3 (ref 1.7–7)
NEUTROPHILS NFR BLD AUTO: 56.8 % (ref 42.7–76)
NRBC BLD AUTO-RTO: 0 /100 WBC (ref 0–0.2)
PLATELET # BLD AUTO: 256 10*3/MM3 (ref 140–450)
PMV BLD AUTO: 11.3 FL (ref 6–12)
POTASSIUM SERPL-SCNC: 4.1 MMOL/L (ref 3.5–5.2)
PROT SERPL-MCNC: 6.8 G/DL (ref 6–8.5)
RBC # BLD AUTO: 5.07 10*6/MM3 (ref 3.77–5.28)
SODIUM SERPL-SCNC: 138 MMOL/L (ref 136–145)
TRIGL SERPL-MCNC: 78 MG/DL (ref 0–150)
VLDLC SERPL-MCNC: 13 MG/DL (ref 5–40)
WBC NRBC COR # BLD: 7.49 10*3/MM3 (ref 3.4–10.8)

## 2023-10-23 PROCEDURE — 85025 COMPLETE CBC W/AUTO DIFF WBC: CPT

## 2023-10-23 PROCEDURE — 80053 COMPREHEN METABOLIC PANEL: CPT

## 2023-10-23 PROCEDURE — 80061 LIPID PANEL: CPT

## 2023-10-23 NOTE — ASSESSMENT & PLAN NOTE
- Patient with left upper quadrant and left lower quadrant abdominal pain that is sharp and shooting  - Can be secondary to constipation, recommended MiraLAX but patient is reluctant and said that she will try to make some dietary modifications  - Obtaining ultrasound for further evaluation and management to make sure that there are no issues with solid organs  - We will follow-up after ultrasound and advise patient further, did discuss potentially needing a colonoscopy for constipation is chronic

## 2023-10-23 NOTE — PROGRESS NOTES
"    Office Note     Name: Mendy Cote    : 1990     MRN: [id number]     Chief Complaint  Annual Exam    Subjective     History of Present Illness:  Mendy Cote is a 33 y.o. female who presents today for annual physical.    Hyperlipidemia: Patient is okay with getting her labs checked today.  Her last LDL was greater than 190.    Left upper quadrant pain: Patient reports intermittent left upper quadrant pain that is sharp and shooting.  Ever since she had her last pregnancy, she reports that she has had issues with constipation.  She tries to eat a high-fiber diet and it does not help.  She declines medications at this time.          Objective     No past medical history on file.  Past Surgical History:   Procedure Laterality Date    D & C WITH SUCTION      Done in [facility]    D & C WITH SUCTION      D & C WITH SUCTION       Family History   Problem Relation Age of Onset    Heart disease Father     Breast cancer Neg Hx     Ovarian cancer Neg Hx        Vital Signs  /72   Pulse 84   Temp 98.4 °F (36.9 °C) (Infrared)   Ht 156 cm (61.42\")   Wt 61.6 kg (135 lb 12.8 oz)   SpO2 98%   BMI 25.31 kg/m²   Estimated body mass index is 25.31 kg/m² as calculated from the following:    Height as of this encounter: 156 cm (61.42\").    Weight as of this encounter: 61.6 kg (135 lb 12.8 oz).    Physical Exam  Vitals reviewed.   Constitutional:       Appearance: Normal appearance.   HENT:      Head: Normocephalic.      Right Ear: External ear normal.      Left Ear: External ear normal.      Nose: Nose normal.      Mouth/Throat:      Mouth: Mucous membranes are moist.   Eyes:      Extraocular Movements: Extraocular movements intact.   Cardiovascular:      Rate and Rhythm: Normal rate and regular rhythm.      Heart sounds: Normal heart sounds.   Pulmonary:      Effort: Pulmonary effort is normal. No respiratory distress.      Breath sounds: Normal breath sounds.   Abdominal:      General: Abdomen is " flat.      Palpations: Abdomen is soft.      Comments: Tender to palpation in left upper quadrant and left lower quadrant of abdomen   Musculoskeletal:      Cervical back: No rigidity.      Comments: Moving all extremities spontaneously   Skin:     General: Skin is warm and dry.   Neurological:      General: No focal deficit present.      Mental Status: She is alert and oriented to person, place, and time.   Psychiatric:         Mood and Affect: Mood normal.         Behavior: Behavior normal.               Assessment and Plan     Diagnoses and all orders for this visit:    1. Annual physical exam (Primary)  Assessment & Plan:  - Discussed diet and exercise: Discussed exercising at least 30 minutes a day 5 times per week, and eating a variety of fresh fruits, vegetables, and lean proteins and eating more complex carbohydrates  - Recommend annual dental and eye exams  - Patient would like referral to gynecology  - Declines recommended vaccines today    Orders:  -     Comprehensive Metabolic Panel; Future  -     CBC & Differential; Future  -     Ambulatory Referral to Gynecology    2. Hyperlipidemia, unspecified hyperlipidemia type  Assessment & Plan:   - Patient with elevated LDL at her last visit  - Discussed diet and exercise  - Obtaining lipid panel today    Orders:  -     Lipid Panel; Future    3. Abdominal pain, unspecified abdominal location  Assessment & Plan:  - Patient with left upper quadrant and left lower quadrant abdominal pain that is sharp and shooting  - Can be secondary to constipation, recommended MiraLAX but patient is reluctant and said that she will try to make some dietary modifications  - Obtaining ultrasound for further evaluation and management to make sure that there are no issues with solid organs  - We will follow-up after ultrasound and advise patient further, did discuss potentially needing a colonoscopy for constipation is chronic    Orders:  -     US Abdomen Complete; Future              Follow Up  Return in about 1 year (around 10/23/2024) for Annual.    Ladan Alvarez MD

## 2023-10-23 NOTE — ASSESSMENT & PLAN NOTE
- Patient with elevated LDL at her last visit  - Discussed diet and exercise  - Obtaining lipid panel today

## 2023-10-23 NOTE — PATIENT INSTRUCTIONS
Health Maintenance, Female  Adopting a healthy lifestyle and getting preventive care can go a long way to promote health and wellness. Talk with your health care provider about what schedule of regular examinations is right for you. This is a good chance for you to check in with your provider about disease prevention and staying healthy.  In between checkups, there are plenty of things you can do on your own. Experts have done a lot of research about which lifestyle changes and preventive measures are most likely to keep you healthy. Ask your health care provider for more information.  Weight and diet  Eat a healthy diet  Be sure to include plenty of vegetables, fruits, low-fat dairy products, and lean protein.  Do not eat a lot of foods high in solid fats, added sugars, or salt.  Get regular exercise. This is one of the most important things you can do for your health.  Most adults should exercise for at least 150 minutes each week. The exercise should increase your heart rate and make you sweat (moderate-intensity exercise).  Most adults should also do strengthening exercises at least twice a week. This is in addition to the moderate-intensity exercise.     Maintain a healthy weight  Body mass index (BMI) is a measurement that can be used to identify possible weight problems. It estimates body fat based on height and weight. Your health care provider can help determine your BMI and help you achieve or maintain a healthy weight.  For females 20 years of age and older:  A BMI below 18.5 is considered underweight.  A BMI of 18.5 to 24.9 is normal.  A BMI of 25 to 29.9 is considered overweight.  A BMI of 30 and above is considered obese.     Watch levels of cholesterol and blood lipids  You should start having your blood tested for lipids and cholesterol at 20 years of age, then have this test every 5 years.  You may need to have your cholesterol levels checked more often if:  Your lipid or cholesterol levels are  high.  You are older than 50 years of age.  You are at high risk for heart disease.     Cancer screening  Lung Cancer  Lung cancer screening is recommended for adults 55-80 years old who are at high risk for lung cancer because of a history of smoking.  A yearly low-dose CT scan of the lungs is recommended for people who:  Currently smoke.  Have quit within the past 15 years.  Have at least a 30-pack-year history of smoking. A pack year is smoking an average of one pack of cigarettes a day for 1 year.  Yearly screening should continue until it has been 15 years since you quit.  Yearly screening should stop if you develop a health problem that would prevent you from having lung cancer treatment.     Breast Cancer  Practice breast self-awareness. This means understanding how your breasts normally appear and feel.  It also means doing regular breast self-exams. Let your health care provider know about any changes, no matter how small.  If you are in your 20s or 30s, you should have a clinical breast exam (CBE) by a health care provider every 1-3 years as part of a regular health exam.  If you are 40 or older, have a CBE every year. Also consider having a breast X-ray (mammogram) every year.  If you have a family history of breast cancer, talk to your health care provider about genetic screening.  If you are at high risk for breast cancer, talk to your health care provider about having an MRI and a mammogram every year.  Breast cancer gene (BRCA) assessment is recommended for women who have family members with BRCA-related cancers. BRCA-related cancers include:  Breast.  Ovarian.  Tubal.  Peritoneal cancers.  Results of the assessment will determine the need for genetic counseling and BRCA1 and BRCA2 testing.     Cervical Cancer  Your health care provider may recommend that you be screened regularly for cancer of the pelvic organs (ovaries, uterus, and vagina). This screening involves a pelvic examination, including  checking for microscopic changes to the surface of your cervix (Pap test). You may be encouraged to have this screening done every 3 years, beginning at age 21.  For women ages 30-65, health care providers may recommend pelvic exams and Pap testing every 3 years, or they may recommend the Pap and pelvic exam, combined with testing for human papilloma virus (HPV), every 5 years. Some types of HPV increase your risk of cervical cancer. Testing for HPV may also be done on women of any age with unclear Pap test results.  Other health care providers may not recommend any screening for nonpregnant women who are considered low risk for pelvic cancer and who do not have symptoms. Ask your health care provider if a screening pelvic exam is right for you.  If you have had past treatment for cervical cancer or a condition that could lead to cancer, you need Pap tests and screening for cancer for at least 20 years after your treatment. If Pap tests have been discontinued, your risk factors (such as having a new sexual partner) need to be reassessed to determine if screening should resume. Some women have medical problems that increase the chance of getting cervical cancer. In these cases, your health care provider may recommend more frequent screening and Pap tests.     Colorectal Cancer  This type of cancer can be detected and often prevented.  Routine colorectal cancer screening usually begins at 50 years of age and continues through 75 years of age.  Your health care provider may recommend screening at an earlier age if you have risk factors for colon cancer.  Your health care provider may also recommend using home test kits to check for hidden blood in the stool.  A small camera at the end of a tube can be used to examine your colon directly (sigmoidoscopy or colonoscopy). This is done to check for the earliest forms of colorectal cancer.  Routine screening usually begins at age 50.  Direct examination of the colon should  be repeated every 5-10 years through 75 years of age. However, you may need to be screened more often if early forms of precancerous polyps or small growths are found.     Skin Cancer  Check your skin from head to toe regularly.  Tell your health care provider about any new moles or changes in moles, especially if there is a change in a mole's shape or color.  Also tell your health care provider if you have a mole that is larger than the size of a pencil eraser.  Always use sunscreen. Apply sunscreen liberally and repeatedly throughout the day.  Protect yourself by wearing long sleeves, pants, a wide-brimmed hat, and sunglasses whenever you are outside.     Heart disease, diabetes, and high blood pressure  High blood pressure causes heart disease and increases the risk of stroke. High blood pressure is more likely to develop in:  People who have blood pressure in the high end of the normal range (130-139/85-89 mm Hg).  People who are overweight or obese.  People who are .  If you are 18-39 years of age, have your blood pressure checked every 3-5 years. If you are 40 years of age or older, have your blood pressure checked every year. You should have your blood pressure measured twice--once when you are at a hospital or clinic, and once when you are not at a hospital or clinic. Record the average of the two measurements. To check your blood pressure when you are not at a hospital or clinic, you can use:  An automated blood pressure machine at a pharmacy.  A home blood pressure monitor.  If you are between 55 years and 79 years old, ask your health care provider if you should take aspirin to prevent strokes.  Have regular diabetes screenings. This involves taking a blood sample to check your fasting blood sugar level.  If you are at a normal weight and have a low risk for diabetes, have this test once every three years after 45 years of age.  If you are overweight and have a high risk for diabetes,  consider being tested at a younger age or more often.  Preventing infection  Hepatitis B  If you have a higher risk for hepatitis B, you should be screened for this virus. You are considered at high risk for hepatitis B if:  You were born in a country where hepatitis B is common. Ask your health care provider which countries are considered high risk.  Your parents were born in a high-risk country, and you have not been immunized against hepatitis B (hepatitis B vaccine).  You have HIV or AIDS.  You use needles to inject street drugs.  You live with someone who has hepatitis B.  You have had sex with someone who has hepatitis B.  You get hemodialysis treatment.  You take certain medicines for conditions, including cancer, organ transplantation, and autoimmune conditions.     Hepatitis C  Blood testing is recommended for:  Everyone born from 1945 through 1965.  Anyone with known risk factors for hepatitis C.     Sexually transmitted infections (STIs)  You should be screened for sexually transmitted infections (STIs) including gonorrhea and chlamydia if:  You are sexually active and are younger than 24 years of age.  You are older than 24 years of age and your health care provider tells you that you are at risk for this type of infection.  Your sexual activity has changed since you were last screened and you are at an increased risk for chlamydia or gonorrhea. Ask your health care provider if you are at risk.  If you do not have HIV, but are at risk, it may be recommended that you take a prescription medicine daily to prevent HIV infection. This is called pre-exposure prophylaxis (PrEP). You are considered at risk if:  You are sexually active and do not regularly use condoms or know the HIV status of your partner(s).  You take drugs by injection.  You are sexually active with a partner who has HIV.     Talk with your health care provider about whether you are at high risk of being infected with HIV. If you choose to  begin PrEP, you should first be tested for HIV. You should then be tested every 3 months for as long as you are taking PrEP.  Pregnancy  If you are premenopausal and you may become pregnant, ask your health care provider about preconception counseling.  If you may become pregnant, take 400 to 800 micrograms (mcg) of folic acid every day.  If you want to prevent pregnancy, talk to your health care provider about birth control (contraception).  Osteoporosis and menopause  Osteoporosis is a disease in which the bones lose minerals and strength with aging. This can result in serious bone fractures. Your risk for osteoporosis can be identified using a bone density scan.  If you are 65 years of age or older, or if you are at risk for osteoporosis and fractures, ask your health care provider if you should be screened.  Ask your health care provider whether you should take a calcium or vitamin D supplement to lower your risk for osteoporosis.  Menopause may have certain physical symptoms and risks.  Hormone replacement therapy may reduce some of these symptoms and risks.  Talk to your health care provider about whether hormone replacement therapy is right for you.  Follow these instructions at home:  Schedule regular health, dental, and eye exams.  Stay current with your immunizations.  Do not use any tobacco products including cigarettes, chewing tobacco, or electronic cigarettes.  If you are pregnant, do not drink alcohol.  If you are breastfeeding, limit how much and how often you drink alcohol.  Limit alcohol intake to no more than 1 drink per day for nonpregnant women. One drink equals 12 ounces of beer, 5 ounces of wine, or 1½ ounces of hard liquor.  Do not use street drugs.  Do not share needles.  Ask your health care provider for help if you need support or information about quitting drugs.  Tell your health care provider if you often feel depressed.  Tell your health care provider if you have ever been abused or do  not feel safe at home.  This information is not intended to replace advice given to you by your health care provider. Make sure you discuss any questions you have with your health care provider.  Document Released: 07/02/2012 Document Revised: 05/25/2017 Document Reviewed: 09/20/2016  [employer] Interactive Patient Education © 2018 [employer]

## 2023-10-23 NOTE — ASSESSMENT & PLAN NOTE
- Discussed diet and exercise: Discussed exercising at least 30 minutes a day 5 times per week, and eating a variety of fresh fruits, vegetables, and lean proteins and eating more complex carbohydrates  - Recommend annual dental and eye exams  - Patient would like referral to gynecology  - Declines recommended vaccines today

## 2023-11-17 RX ORDER — CETIRIZINE HYDROCHLORIDE 10 MG/1
10 TABLET ORAL DAILY
Qty: 30 TABLET | Refills: 0 | Status: SHIPPED | OUTPATIENT
Start: 2023-11-17

## 2023-11-28 ENCOUNTER — HOSPITAL ENCOUNTER (OUTPATIENT)
Dept: ULTRASOUND IMAGING | Facility: HOSPITAL | Age: 33
Discharge: HOME | End: 2023-11-28
Admitting: STUDENT IN AN ORGANIZED HEALTH CARE EDUCATION/TRAINING PROGRAM
Payer: COMMERCIAL

## 2023-11-28 DIAGNOSIS — R10.9 ABDOMINAL PAIN, UNSPECIFIED ABDOMINAL LOCATION: ICD-10-CM

## 2023-11-28 PROCEDURE — 76700 US EXAM ABDOM COMPLETE: CPT

## 2025-02-17 ENCOUNTER — TRANSCRIBE ORDERS (OUTPATIENT)
Dept: OBSTETRICS AND GYNECOLOGY | Facility: HOSPITAL | Age: 35
End: 2025-02-17
Payer: MEDICAID

## 2025-02-17 DIAGNOSIS — Z36.3 ENCOUNTER FOR ROUTINE SCREENING FOR MALFORMATION USING ULTRASONICS: Primary | ICD-10-CM

## 2025-02-17 DIAGNOSIS — Z87.59 HISTORY OF IUFD: ICD-10-CM

## 2025-02-17 DIAGNOSIS — Z34.90 PREGNANCY, UNSPECIFIED GESTATIONAL AGE: ICD-10-CM

## 2025-03-11 ENCOUNTER — HOSPITAL ENCOUNTER (OUTPATIENT)
Dept: WOMENS IMAGING | Facility: HOSPITAL | Age: 35
Discharge: HOME | End: 2025-03-11
Admitting: ADVANCED PRACTICE MIDWIFE
Payer: MEDICAID

## 2025-03-11 ENCOUNTER — OFFICE VISIT (OUTPATIENT)
Dept: OBSTETRICS AND GYNECOLOGY | Facility: HOSPITAL | Age: 35
End: 2025-03-11
Payer: MEDICAID

## 2025-03-11 VITALS
SYSTOLIC BLOOD PRESSURE: 99 MMHG | WEIGHT: 139.6 LBS | BODY MASS INDEX: 24.73 KG/M2 | HEIGHT: 63 IN | DIASTOLIC BLOOD PRESSURE: 65 MMHG

## 2025-03-11 DIAGNOSIS — Z34.90 PREGNANCY, UNSPECIFIED GESTATIONAL AGE: ICD-10-CM

## 2025-03-11 DIAGNOSIS — Z36.3 ENCOUNTER FOR ROUTINE SCREENING FOR MALFORMATION USING ULTRASONICS: ICD-10-CM

## 2025-03-11 DIAGNOSIS — Z87.59 HISTORY OF IUFD: ICD-10-CM

## 2025-03-11 DIAGNOSIS — O36.4XX0 IUFD AT 20 WEEKS OR MORE OF GESTATION: Primary | ICD-10-CM

## 2025-03-11 DIAGNOSIS — O44.40 LOW-LYING PLACENTA: ICD-10-CM

## 2025-03-11 DIAGNOSIS — Z82.79 FAMILY HISTORY OF CONGENITAL HEART DEFECT: ICD-10-CM

## 2025-03-11 PROCEDURE — 76811 OB US DETAILED SNGL FETUS: CPT

## 2025-03-11 NOTE — LETTER
"2025     Chicho Vargas CNM  [address] [facility]  [address] [place], [place] [address]    Patient: Mendy Cote   YOB: 1990   Date of Visit: 3/11/2025     Dear Chicho Vargas CNM:       Thank you for referring Mendy Cote to me for evaluation. Below are the relevant portions of my assessment and plan of care.    If you have questions, please do not hesitate to call me. I look forward to following Mendy along with you.         Sincerely,        Douglas A. Milligan, MD        CC: No Recipients    Milligan, Douglas A, MD  [phone]  Sign when Signing Visit  Documentation of the ultrasound findings, images, and interpretations will be available in the patient's Viewpoint report which is located in the imaging tab in chart review.    Maternal/Fetal Medicine Consult Note     Name: Mendy Cote    : 1990     MRN: [id number]     Referring Provider: Chicho Vargas CNM    Chief Complaint  incomplete anatomy - concern for vasa previa; hx 32wk IUFD    Subjective     History of Present Illness:  Mendy Cote is a 34 y.o.  22w3d who presents today for Hx IUFD    CHINYERE: Estimated Date of Delivery: 25     ROS:   As noted in HPI.     History reviewed. No pertinent past medical history.   Past Surgical History:   Procedure Laterality Date   • D & C WITH SUCTION      Done in [facility]   • D & C WITH SUCTION     • D & C WITH SUCTION        OB History          9    Para   5    Term   4       1    AB   4    Living   4         SAB   4    IAB   0    Ectopic   0    Molar   0    Multiple   1    Live Births   4          Obstetric Comments   2013 - [facility]  2015 - [facility]ielos  2017 - Johanna  2019 Ayad [facility]                Objective     Vital Signs  BP 99/65   Ht 158.8 cm (62.5\")   Wt 63.3 kg (139 lb 9.6 oz)   LMP 10/05/2024   Estimated body mass index is 25.13 kg/m² as calculated from the following:    Height as of this encounter: 158.8 cm (62.5\").    Weight as of " this encounter: 63.3 kg (139 lb 9.6 oz).    Physical Exam    Ultrasound Impression:   See Viewpoint    Assessment and Plan     Mendy Cote is a 34 y.o.  22w3d who presents today for Hx IUFD    Diagnoses and all orders for this visit:    1. IUFD at 20 weeks or more of gestation (Primary)  Assessment & Plan:  Patient's last pregnancy complicated by an intrauterine fetal demise at 32 weeks.  She noted no etiology was found.  Patient's collagen-vascular disease workup appears normal.  Patient has no family history of recurrent pregnancy loss.    Ultrasound today demonstrates a normally grown fetus with no abnormality seen.  In particular no fetal markers for trisomy.  Fetal heart appeared structurally normal.  Amniotic fluid volume and cervical length were normal.    Unexplained intrauterine fetal demise's are frequently the results of cord accidents.  If this was the case then there is essentially no recurrence risk.  However since we are unable to be certain of the etiology would recommend  testing beginning at approximately 30 weeks gestation and continuing till term.    We will rescan the patient again in 8 weeks time to assess fetal growth and wellbeing.    Orders:  -     [facility]; Future    2. Family history of congenital heart defect  Assessment & Plan:  Patient initially gave a history of her previous child born with holes in his heart.  On extensive questioning with her  she reports that an abnormality was suspected but that and follow-up after delivery that no specific abnormalities were seen.  We obtained good views of the fetal heart at this visit and saw no structural abnormalities.  We will rescan the fetal heart again in 8 weeks time and reassess the heart but at the current time I believe the patient is low risk for congenital heart disease.    Orders:  -     [facility] [facility]; Future    3. Pregnancy, unspecified gestational age  -      [facility]  Diagnostic Center; Future    4. Low-lying placenta  Assessment & Plan:  Patient noted to have a low-lying placenta today.  This anterior placenta additionally has vessels associated with it that were near the cervix but not over it.  At the current time I do not believe this represents a vasa previa.  We will reassess the placenta and vessels again in 8 weeks time.  Hopefully the vessels the placenta will be even further away from the cervix at the neck scan.    Orders:  -     [facility]  Diagnostic Center; Future         Follow Up  Return in about 8 weeks (around 2025).    I spent 30 minutes caring for the patient on the day of service. This included: obtaining or reviewing a separately obtained medical history, reviewing patient records, performing a medically appropriate exam and/or evaluation, counseling or educating the patient/family/caregiver, ordering medications, labs, and/or procedures and documenting such in the medical record. This does not include time spent on review and interpretation of other tests such as fetal ultrasound or the performance of other procedures such as amniocentesis or CVS.      Douglas A. Milligan, MD  Maternal Fetal Medicine, [facility]    Diagnostic [place]     2025

## 2025-03-11 NOTE — PROGRESS NOTES
"Documentation of the ultrasound findings, images, and interpretations will be available in the patient's Viewpoint report which is located in the imaging tab in chart review.    Maternal/Fetal Medicine Consult Note     Name: Mendy Cote    : 1990     MRN: [id number]     Referring Provider: Chicho Vargas CNM    Chief Complaint  incomplete anatomy - concern for vasa previa; hx 32wk IUFD    Subjective     History of Present Illness:  Mendy Cote is a 34 y.o.  22w3d who presents today for Hx IUFD    CHINYERE: Estimated Date of Delivery: 25     ROS:   As noted in HPI.     History reviewed. No pertinent past medical history.   Past Surgical History:   Procedure Laterality Date    D & C WITH SUCTION      Done in [facility]    D & C WITH SUCTION      D & C WITH SUCTION        OB History          9    Para   5    Term   4       1    AB   4    Living   4         SAB   4    IAB   0    Ectopic   0    Molar   0    Multiple   1    Live Births   4          Obstetric Comments   2013 - [facility]  2015 - Marielos  2017 - Johanna  2019 - [place]                Objective     Vital Signs  BP 99/65   Ht 158.8 cm (62.5\")   Wt 63.3 kg (139 lb 9.6 oz)   LMP 10/05/2024   Estimated body mass index is 25.13 kg/m² as calculated from the following:    Height as of this encounter: 158.8 cm (62.5\").    Weight as of this encounter: 63.3 kg (139 lb 9.6 oz).    Physical Exam    Ultrasound Impression:   See Viewpoint    Assessment and Plan     Mendy Cote is a 34 y.o.  22w3d who presents today for Hx IUFD    Diagnoses and all orders for this visit:    1. IUFD at 20 weeks or more of gestation (Primary)  Assessment & Plan:  Patient's last pregnancy complicated by an intrauterine fetal demise at 32 weeks.  She noted no etiology was found.  Patient's collagen-vascular disease workup appears normal.  Patient has no family history of recurrent pregnancy loss.    Ultrasound today demonstrates a normally " grown fetus with no abnormality seen.  In particular no fetal markers for trisomy.  Fetal heart appeared structurally normal.  Amniotic fluid volume and cervical length were normal.    Unexplained intrauterine fetal demise's are frequently the results of cord accidents.  If this was the case then there is essentially no recurrence risk.  However since we are unable to be certain of the etiology would recommend  testing beginning at approximately 30 weeks gestation and continuing till term.    We will rescan the patient again in 8 weeks time to assess fetal growth and wellbeing.    Orders:  -     [facility]ostic Center; Future    2. Family history of congenital heart defect  Assessment & Plan:  Patient initially gave a history of her previous child born with holes in his heart.  On extensive questioning with her  she reports that an abnormality was suspected but that and follow-up after delivery that no specific abnormalities were seen.  We obtained good views of the fetal heart at this visit and saw no structural abnormalities.  We will rescan the fetal heart again in 8 weeks time and reassess the heart but at the current time I believe the patient is low risk for congenital heart disease.    Orders:  -     [facility]; Future    3. Pregnancy, unspecified gestational age  -     [facility]; Future    4. Low-lying placenta  Assessment & Plan:  Patient noted to have a low-lying placenta today.  This anterior placenta additionally has vessels associated with it that were near the cervix but not over it.  At the current time I do not believe this represents a vasa previa.  We will reassess the placenta and vessels again in 8 weeks time.  Hopefully the vessels the placenta will be even further away from the cervix at the neck scan.    Orders:  -     [facility]; Future         Follow Up  Return in about 8 weeks (around  2025).    I spent 30 minutes caring for the patient on the day of service. This included: obtaining or reviewing a separately obtained medical history, reviewing patient records, performing a medically appropriate exam and/or evaluation, counseling or educating the patient/family/caregiver, ordering medications, labs, and/or procedures and documenting such in the medical record. This does not include time spent on review and interpretation of other tests such as fetal ultrasound or the performance of other procedures such as amniocentesis or CVS.      Douglas A. Milligan, MD  Maternal Fetal Medicine, [facility]    Diagnostic Center     2025

## 2025-03-11 NOTE — ASSESSMENT & PLAN NOTE
Patient's last pregnancy complicated by an intrauterine fetal demise at 32 weeks.  She noted no etiology was found.  Patient's collagen-vascular disease workup appears normal.  Patient has no family history of recurrent pregnancy loss.    Ultrasound today demonstrates a normally grown fetus with no abnormality seen.  In particular no fetal markers for trisomy.  Fetal heart appeared structurally normal.  Amniotic fluid volume and cervical length were normal.    Unexplained intrauterine fetal demise's are frequently the results of cord accidents.  If this was the case then there is essentially no recurrence risk.  However since we are unable to be certain of the etiology would recommend  testing beginning at approximately 30 weeks gestation and continuing till term.    We will rescan the patient again in 8 weeks time to assess fetal growth and wellbeing.

## 2025-03-11 NOTE — ASSESSMENT & PLAN NOTE
Patient noted to have a low-lying placenta today.  This anterior placenta additionally has vessels associated with it that were near the cervix but not over it.  At the current time I do not believe this represents a vasa previa.  We will reassess the placenta and vessels again in 8 weeks time.  Hopefully the vessels the placenta will be even further away from the cervix at the neck scan.

## 2025-03-11 NOTE — PROGRESS NOTES
Patient denies any leaking fluid or vaginal bleeding. She reports abdominal cramping and back pain yesterday, but none today.  NIPT low risk.  Patient reports next follow-up appointment with CHEPE Vargas's office is 3/17.

## 2025-03-11 NOTE — ASSESSMENT & PLAN NOTE
Patient initially gave a history of her previous child born with holes in his heart.  On extensive questioning with her  she reports that an abnormality was suspected but that and follow-up after delivery that no specific abnormalities were seen.  We obtained good views of the fetal heart at this visit and saw no structural abnormalities.  We will rescan the fetal heart again in 8 weeks time and reassess the heart but at the current time I believe the patient is low risk for congenital heart disease.

## 2025-05-06 ENCOUNTER — OFFICE VISIT (OUTPATIENT)
Dept: OBSTETRICS AND GYNECOLOGY | Facility: HOSPITAL | Age: 35
End: 2025-05-06
Payer: MEDICAID

## 2025-05-06 ENCOUNTER — HOSPITAL ENCOUNTER (OUTPATIENT)
Dept: WOMENS IMAGING | Facility: HOSPITAL | Age: 35
Discharge: HOME | End: 2025-05-06
Admitting: OBSTETRICS & GYNECOLOGY
Payer: MEDICAID

## 2025-05-06 VITALS — BODY MASS INDEX: 27.54 KG/M2 | DIASTOLIC BLOOD PRESSURE: 67 MMHG | SYSTOLIC BLOOD PRESSURE: 106 MMHG | WEIGHT: 153 LBS

## 2025-05-06 DIAGNOSIS — O44.40 LOW-LYING PLACENTA: ICD-10-CM

## 2025-05-06 DIAGNOSIS — O36.4XX0 IUFD AT 20 WEEKS OR MORE OF GESTATION: ICD-10-CM

## 2025-05-06 DIAGNOSIS — Z34.90 PREGNANCY, UNSPECIFIED GESTATIONAL AGE: ICD-10-CM

## 2025-05-06 DIAGNOSIS — Z82.79 FAMILY HISTORY OF CONGENITAL HEART DEFECT: ICD-10-CM

## 2025-05-06 DIAGNOSIS — O44.40 LOW-LYING PLACENTA: Primary | ICD-10-CM

## 2025-05-06 PROCEDURE — 76817 TRANSVAGINAL US OBSTETRIC: CPT

## 2025-05-06 PROCEDURE — 76816 OB US FOLLOW-UP PER FETUS: CPT

## 2025-05-06 NOTE — ASSESSMENT & PLAN NOTE
Patient presents for follow-up of placental location.  Placenta appears to be left lateral with a posterior component.  Posterior component measuring 1 to 2 cm away.  We discussed general recommendations for at least 2 cm away from internal os for safe vaginal delivery.  Plan for evaluation in 4 weeks.  No evidence of vasa previa.

## 2025-05-06 NOTE — PROGRESS NOTES
"    Maternal/Fetal Medicine Consult Note   Date: 2025  Name: Mendy Cote    : 1990     MRN: [id number]     Referring Provider: Chicho Vargas CNM    Chief Complaint  F/u placenta; vasa previa; Hx IUFD-32 WK; Recurrent loss    Subjective     History of Present Illness:  Mendy Cote is a 34 y.o.  30w3d who presents today for placental follow-up    CHINYERE: Estimated Date of Delivery: 25     ROS:   Otherwise Noted in HPI      Current Outpatient Medications:     Prenatal Vit-Fe Fumarate-FA (PRENATAL VITAMIN PO), Take  by mouth., Disp: , Rfl:     cetirizine (zyrTEC) 10 MG tablet, Take 1 tablet by mouth Daily. (Patient not taking: Reported on 2025), Disp: 30 tablet, Rfl: 0    docusate sodium 100 MG capsule, Take 1 capsule by mouth 2 (Two) Times a Day. (Patient not taking: Reported on 10/23/2023), Disp: 60 capsule, Rfl: 0    Objective     Vital Signs  /67   Wt 69.4 kg (153 lb)   LMP 10/05/2024   Estimated body mass index is 27.54 kg/m² as calculated from the following:    Height as of 3/11/25: 158.8 cm (62.5\").    Weight as of this encounter: 69.4 kg (153 lb).    Ultrasound Impression:   See Viewpoint     Assessment and Plan     Mendy Cote is a 34 y.o.  30w3d who presents today for placenta follow-up    Diagnoses and all orders for this visit:    1. Low-lying placenta (Primary)  Assessment & Plan:  Patient presents for follow-up of placental location.  Placenta appears to be left lateral with a posterior component.  Posterior component measuring 1 to 2 cm away.  We discussed general recommendations for at least 2 cm away from internal os for safe vaginal delivery.  Plan for evaluation in 4 weeks.  No evidence of vasa previa.       In person Ukranian  used    Follow Up  4 weeks    I spent 10 minutes caring for the patient on the day of service. This included: obtaining or reviewing a separately obtained medical history, reviewing patient records, performing a " medically appropriate exam and/or evaluation, counseling or educating the patient/family/caregiver, ordering medications, labs, and/or procedures and documenting such in the medical record. This does not include time spent on review and interpretation of other tests such as fetal ultrasound or the performance of other procedures such as amniocentesis or CVS.      Luciano Kelly MD, FACOG  Maternal Fetal Medicine, [facility]

## 2025-05-06 NOTE — LETTER
"May 6, 2025     Chicho Vargas CNM  [address] [id number]    Patient: Mendy Cote   YOB: 1990   Date of Visit: 2025     Dear Chicho Vargas CNM:       Thank you for referring Mendy Cote to me for evaluation. Below are the relevant portions of my assessment and plan of care.    If you have questions, please do not hesitate to call me. I look forward to following Mendy along with you.         Sincerely,        Luciano Kelly MD        CC: No Recipients    Luciano Kelly MD  [phone]  Sign when Signing Visit      Maternal/Fetal Medicine Consult Note   Date: 2025  Name: Mendy Cote    : 1990     MRN: [id number]     Referring Provider: Chicho Vargas CNM    Chief Complaint  F/u placenta; vasa previa; Hx IUFD-32 WK; Recurrent loss    Subjective     History of Present Illness:  Mendy Cote is a 34 y.o.  30w3d who presents today for placental follow-up    CHINYERE: Estimated Date of Delivery: 25     ROS:   Otherwise Noted in HPI      Current Outpatient Medications:   •  Prenatal Vit-Fe Fumarate-FA (PRENATAL VITAMIN PO), Take  by mouth., Disp: , Rfl:   •  cetirizine (zyrTEC) 10 MG tablet, Take 1 tablet by mouth Daily. (Patient not taking: Reported on 2025), Disp: 30 tablet, Rfl: 0  •  docusate sodium 100 MG capsule, Take 1 capsule by mouth 2 (Two) Times a Day. (Patient not taking: Reported on 10/23/2023), Disp: 60 capsule, Rfl: 0    Objective     Vital Signs  /67   Wt 69.4 kg (153 lb)   LMP 10/05/2024   Estimated body mass index is 27.54 kg/m² as calculated from the following:    Height as of 3/11/25: 158.8 cm (62.5\").    Weight as of this encounter: 69.4 kg (153 lb).    Ultrasound Impression:   See Viewpoint     Assessment and Plan     Mendy Cote is a 34 y.o.  30w3d who presents today for placenta follow-up    Diagnoses and all orders for this visit:    1. Low-lying placenta (Primary)  Assessment & " Plan:  Patient presents for follow-up of placental location.  Placenta appears to be left lateral with a posterior component.  Posterior component measuring 1 to 2 cm away.  We discussed general recommendations for at least 2 cm away from internal os for safe vaginal delivery.  Plan for evaluation in 4 weeks.  No evidence of vasa previa.       In person Ukranian  used    Follow Up  4 weeks    I spent 10 minutes caring for the patient on the day of service. This included: obtaining or reviewing a separately obtained medical history, reviewing patient records, performing a medically appropriate exam and/or evaluation, counseling or educating the patient/family/caregiver, ordering medications, labs, and/or procedures and documenting such in the medical record. This does not include time spent on review and interpretation of other tests such as fetal ultrasound or the performance of other procedures such as amniocentesis or CVS.      Luciano Kelly MD, FACOG  Maternal Fetal Medicine, [facility]

## 2025-05-06 NOTE — PROGRESS NOTES
Using  H[employer]ony ([id number])  Patient denies any leaking fluid, vaginal bleeding, or contractions.  NIPT low risk.  Patient reports next follow-up appointment with CHEPE Vargas's office is 5/16.

## 2025-06-04 ENCOUNTER — HOSPITAL ENCOUNTER (OUTPATIENT)
Dept: WOMENS IMAGING | Facility: HOSPITAL | Age: 35
Discharge: HOME | End: 2025-06-04
Admitting: OBSTETRICS & GYNECOLOGY
Payer: MEDICAID

## 2025-06-04 ENCOUNTER — OFFICE VISIT (OUTPATIENT)
Dept: OBSTETRICS AND GYNECOLOGY | Facility: HOSPITAL | Age: 35
End: 2025-06-04
Payer: MEDICAID

## 2025-06-04 VITALS — WEIGHT: 154 LBS | SYSTOLIC BLOOD PRESSURE: 120 MMHG | BODY MASS INDEX: 27.72 KG/M2 | DIASTOLIC BLOOD PRESSURE: 70 MMHG

## 2025-06-04 DIAGNOSIS — O36.4XX0 IUFD AT 20 WEEKS OR MORE OF GESTATION: ICD-10-CM

## 2025-06-04 DIAGNOSIS — O44.40 LOW-LYING PLACENTA: ICD-10-CM

## 2025-06-04 DIAGNOSIS — O44.40 LOW-LYING PLACENTA: Primary | ICD-10-CM

## 2025-06-04 PROCEDURE — 76817 TRANSVAGINAL US OBSTETRIC: CPT

## 2025-06-04 PROCEDURE — 76819 FETAL BIOPHYS PROFIL W/O NST: CPT

## 2025-06-04 PROCEDURE — 76816 OB US FOLLOW-UP PER FETUS: CPT

## 2025-06-04 NOTE — PROGRESS NOTES
Using Merlin  Kamila ([id number])  Patient denies any leaking of fluid, vaginal bleeding, or contractions.  NIPT low risk.  Patient reports next follow-up appointment with CHEPE Vargas's office is not confirmed, patient to call after PDC appointment to confirm date and time.

## 2025-06-04 NOTE — PROGRESS NOTES
"Documentation of the ultrasound findings, images, and interpretations will be available in the patient's Viewpoint report which is located in the imaging tab in chart review.    Maternal/Fetal Medicine Follow Up  Note     Name: Mendy Cote    : 1990     MRN: [id number]     Referring Provider: Chicho Vargas CNM    Chief Complaint  Low lying placenta; Hxt PTD w/IUFD-32 wk    Subjective     History of Present Illness:  Mendy Cote is a 34 y.o.  34w4d who presents today for low lying placenta    CHINYERE: Estimated Date of Delivery: 25     ROS:   As noted in HPI.     Objective     Vital Signs  /70   Wt 69.9 kg (154 lb)   LMP 10/05/2024   Estimated body mass index is 27.72 kg/m² as calculated from the following:    Height as of 3/11/25: 158.8 cm (62.5\").    Weight as of this encounter: 69.9 kg (154 lb).    Physical Exam    Ultrasound Impression:   See Viewpoint    Assessment and Plan     Mendy Cote is a 34 y.o.  34w4d who presents today for low lying placenta    Diagnoses and all orders for this visit:    1. Low-lying placenta (Primary)  Assessment & Plan:  Patient returns today for follow-up for pregnancy complicated by low-lying placenta.  Additionally patient had a previous intrauterine fetal demise at 32 weeks gestation.  Patient notes no complications since her last visit.  She notes no vaginal bleeding.  She notes good fetal movement.    Ultrasound today demonstrates a normally grown fetus with no abnormality seen.  Amniotic fluid volume and umbilical artery Dopplers are normal.  BPP is 8 out of 8.  On transvaginal scan the placenta wraps around to the patient's left side and remains 1.7 cm away from the internal os.    Patient still has a low-lying placenta/marginal previa we would recommend delivery by  section at 36 weeks gestation.  Patient reports that she is having regular  testing at her primary OB office and we would recommend continuing this until " delivery.    Patient was counseled extensively regarding fetal movement and will contact her provider immediately if she notices any decreased fetal movement.      2. IUFD at 20 weeks or more of gestation         Follow Up  No follow-ups on file.    I spent 20 minutes caring for the patient on the day of service. This included: obtaining or reviewing a separately obtained medical history, reviewing patient records, performing a medically appropriate exam and/or evaluation, counseling or educating the patient/family/caregiver, ordering medications, labs, and/or procedures and documenting such in the medical record. This does not include time spent on review and interpretation of other tests such as fetal ultrasound or the performance of other procedures such as amniocentesis or CVS.      Douglas A. Milligan, MD  Maternal Fetal Medicine, [facility]     2025

## 2025-06-04 NOTE — ASSESSMENT & PLAN NOTE
Patient returns today for follow-up for pregnancy complicated by low-lying placenta.  Additionally patient had a previous intrauterine fetal demise at 32 weeks gestation.  Patient notes no complications since her last visit.  She notes no vaginal bleeding.  She notes good fetal movement.    Ultrasound today demonstrates a normally grown fetus with no abnormality seen.  Amniotic fluid volume and umbilical artery Dopplers are normal.  BPP is 8 out of 8.  On transvaginal scan the placenta wraps around to the patient's left side and remains 1.7 cm away from the internal os.    Patient still has a low-lying placenta/marginal previa we would recommend delivery by  section at 36 weeks gestation.  Patient reports that she is having regular  testing at her primary OB office and we would recommend continuing this until delivery.    Patient was counseled extensively regarding fetal movement and will contact her provider immediately if she notices any decreased fetal movement.

## 2025-06-04 NOTE — LETTER
"2025     Chicho Vargas CNM  [address] [address]    Patient: Mendy Cote   YOB: 1990   Date of Visit: 2025     Dear Chicho Vargas CNM:       Thank you for referring Mendy Cote to me for evaluation. Below are the relevant portions of my assessment and plan of care.    If you have questions, please do not hesitate to call me. I look forward to following Mendy along with you.         Sincerely,        Douglas A. Milligan, MD        CC: No Recipients    Milligan, Douglas A, MD  25 [phone]  Sign when Signing Visit  Documentation of the ultrasound findings, images, and interpretations will be available in the patient's Viewpoint report which is located in the imaging tab in chart review.    Maternal/Fetal Medicine Follow Up  Note     Name: Mendy Cote    : 1990     MRN: [id number]     Referring Provider: Chicho Vargas CNM    Chief Complaint  Low lying placenta; Hxt PTD w/IUFD-32 wk    Subjective     History of Present Illness:  Mendy Cote is a 34 y.o.  34w4d who presents today for low lying placenta    CHINYERE: Estimated Date of Delivery: 25     ROS:   As noted in HPI.     Objective     Vital Signs  /70   Wt 69.9 kg (154 lb)   LMP 10/05/2024   Estimated body mass index is 27.72 kg/m² as calculated from the following:    Height as of 3/11/25: 158.8 cm (62.5\").    Weight as of this encounter: 69.9 kg (154 lb).    Physical Exam    Ultrasound Impression:   See Viewpoint    Assessment and Plan     Mendy Cote is a 34 y.o.  34w4d who presents today for low lying placenta    Diagnoses and all orders for this visit:    1. Low-lying placenta (Primary)  Assessment & Plan:  Patient returns today for follow-up for pregnancy complicated by low-lying placenta.  Additionally patient had a previous intrauterine fetal demise at 32 weeks gestation.  Patient notes no complications since her last visit.  She notes no vaginal " bleeding.  She notes good fetal movement.    Ultrasound today demonstrates a normally grown fetus with no abnormality seen.  Amniotic fluid volume and umbilical artery Dopplers are normal.  BPP is 8 out of 8.  On transvaginal scan the placenta wraps around to the patient's left side and remains 1.7 cm away from the internal os.    Patient still has a low-lying placenta/marginal previa we would recommend delivery by  section at 36 weeks gestation.  Patient reports that she is having regular  testing at her primary OB office and we would recommend continuing this until delivery.    Patient was counseled extensively regarding fetal movement and will contact her provider immediately if she notices any decreased fetal movement.      2. IUFD at 20 weeks or more of gestation         Follow Up  No follow-ups on file.    I spent 20 minutes caring for the patient on the day of service. This included: obtaining or reviewing a separately obtained medical history, reviewing patient records, performing a medically appropriate exam and/or evaluation, counseling or educating the patient/family/caregiver, ordering medications, labs, and/or procedures and documenting such in the medical record. This does not include time spent on review and interpretation of other tests such as fetal ultrasound or the performance of other procedures such as amniocentesis or CVS.      Douglas A. Milligan, MD  Maternal Fetal Medicine, [facility]     2025

## 2025-06-10 PROBLEM — Z34.90 TERM PREGNANCY: Status: ACTIVE | Noted: 2025-06-10

## 2025-06-17 ENCOUNTER — PRE-ADMISSION TESTING (OUTPATIENT)
Dept: PREADMISSION TESTING | Facility: HOSPITAL | Age: 35
End: 2025-06-17
Payer: MEDICAID

## 2025-06-17 VITALS — BODY MASS INDEX: 29.3 KG/M2 | WEIGHT: 155.2 LBS | HEIGHT: 61 IN

## 2025-06-17 DIAGNOSIS — Z98.891 PREVIOUS CESAREAN SECTION: ICD-10-CM

## 2025-06-17 LAB
ABO GROUP BLD: NORMAL
BLD GP AB SCN SERPL QL: NEGATIVE
DEPRECATED RDW RBC AUTO: 43.8 FL (ref 37–54)
ERYTHROCYTE [DISTWIDTH] IN BLOOD BY AUTOMATED COUNT: 14.9 % (ref 12.3–15.4)
HCT VFR BLD AUTO: 35.1 % (ref 34–46.6)
HGB BLD-MCNC: 11.7 G/DL (ref 12–15.9)
MCH RBC QN AUTO: 27.3 PG (ref 26.6–33)
MCHC RBC AUTO-ENTMCNC: 33.3 G/DL (ref 31.5–35.7)
MCV RBC AUTO: 82 FL (ref 79–97)
PLATELET # BLD AUTO: 197 10*3/MM3 (ref 140–450)
PMV BLD AUTO: 11.3 FL (ref 6–12)
RBC # BLD AUTO: 4.28 10*6/MM3 (ref 3.77–5.28)
RH BLD: NEGATIVE
T&S EXPIRATION DATE: NORMAL
TREPONEMA PALLIDUM IGG+IGM AB [PRESENCE] IN SERUM OR PLASMA BY IMMUNOASSAY: NORMAL
WBC NRBC COR # BLD AUTO: 9.88 10*3/MM3 (ref 3.4–10.8)

## 2025-06-17 PROCEDURE — 36415 COLL VENOUS BLD VENIPUNCTURE: CPT

## 2025-06-17 PROCEDURE — 85027 COMPLETE CBC AUTOMATED: CPT

## 2025-06-17 PROCEDURE — 86900 BLOOD TYPING SEROLOGIC ABO: CPT

## 2025-06-17 PROCEDURE — 86850 RBC ANTIBODY SCREEN: CPT

## 2025-06-17 PROCEDURE — 86901 BLOOD TYPING SEROLOGIC RH(D): CPT

## 2025-06-17 PROCEDURE — 86780 TREPONEMA PALLIDUM: CPT | Performed by: OBSTETRICS & GYNECOLOGY

## 2025-06-17 NOTE — DISCHARGE INSTRUCTIONS
What to know before your arrive:    -Do not eat, drink or chew gum beginning 8 hours before your scheduled arrival time to the hospital.  Except please drink 20 ounces of Gatorade and complete two hours before your given arrival time to the hospital.  If you drink too close to surgery time, your sugery may  be delayed or cancelled.  Please complete as instructed.     -Do not shave any part of your body including abdomen or pelvic are for two days before your procedure.  -If you are taking a scheduled medication (insulin, blood pressure medicine,antibiotics) please consult with your physician whether to take on the day of surgery.  -Remove all jewelry including rings, wedding bands, and piercing before coming to the hospital.  -Leave important valuables at home.  -Do not wear dark fingernail polish.  -Please take two Tylenol 500 mg tablets the evening before surgery.  -Bring the following with you to the hospital:    -Picture ID and insurance, Medicare or Medicaid cards    -Co-pay/deductible required by insurance (Cash, Check, Credit Card)    -Copy of living will or power  document (if applicable)    -CPAP mask and tubing, not machine (if applicable)    -Skin prep instructions sheet    What to know the day of procedure:    -Park in the [facility], take elevator for first floor, exit to the right and  proceed through the doors to outside, follow the covered sidewalk to the entrance of the [facility], follow the hallway and signs [facility], enter the [facility] to your right BEFORE entering the 1720 lobby.  Take the elevators to the 3rd floor ([address] [facility]).  -Leave unnecessary items in your vehicle, including your suitcase.  Your support  person or a family member can get it for you after your procedure.   -Check in at the reception desk in the lobby of the 3rd floor ([address] [facility]).   -One person may accompany you to the pre-op/recovery area.  Please have  other family members wait in the  waiting room.   -An anesthesiologist will meet with your prior to your procedure.   -After anesthesia has been initiated, one person may accompany you in the  operating room.   -No video cameras are permitted in the operating room; only still cameras,  Please.      What to expect while you are in recovery:     -One person may stay with you while you are in recovery.   -If the baby is stable, he/she may visit to initiate breastfeeding & Kangaroo Care.      CHLORHEXIDINE GLUCONATE WIPES AND INSTRUCTIONS GIVEN TO PATIENT

## 2025-06-17 NOTE — PAT
An arrival time for procedure was not provided during PAT visit. If patient had any questions or concerns about their arrival time, they were instructed to contact their surgeon/physician.  Additionally, if the patient referred to an arrival time that was acquired from their my chart account, patient was encouraged to verify that time with their surgeon/physician. Arrival times are NOT provided in Pre Admission Testing Department.     Patient denies any current skin issues.     Instructed patient to take two Tylenol extra strength (total of 1000 mg) the night before surgery.    Patient to apply Chlorhexadine wipes  to surgical area (as instructed) the night before procedure and the AM of procedure. Wipes provided.    Patient instructed to drink 20 ounces of Gatorade or Gatorlyte (if diabetic) and it needs to be completed 1 hour (for Main OR patients) or 2 hours (scheduled  section & [facility] patients) before given arrival time for procedure (NO RED Gatorade and NO Gatorade Zero).    Patient verbalized understanding.    Blood bank bracelet applied to patient during Pre Admission Testing visit.  Patient instructed not to remove from arm until after procedure and they are discharged from the hospital.  Explained to patient that they may shower and get the bracelet wet, but not to immerse under water for longer periods (bathing, swimming, hand dishwashing, etc).  Patient verbalized understanding.    Nepalese  USED. MARÍA FROM [employer]. PATIENT STATES A  IS SCHEDULED TO BE WITH HER DAY OF PROCEDURE. BLUE NOTE LEFT ON CHART.

## 2025-06-18 ENCOUNTER — HOSPITAL ENCOUNTER (INPATIENT)
Facility: HOSPITAL | Age: 35
LOS: 3 days | Discharge: HOME | End: 2025-06-21
Attending: OBSTETRICS & GYNECOLOGY | Admitting: OBSTETRICS & GYNECOLOGY
Payer: MEDICAID

## 2025-06-18 ENCOUNTER — ANESTHESIA (OUTPATIENT)
Dept: LABOR AND DELIVERY | Facility: HOSPITAL | Age: 35
End: 2025-06-18
Payer: MEDICAID

## 2025-06-18 ENCOUNTER — ANESTHESIA EVENT (OUTPATIENT)
Dept: LABOR AND DELIVERY | Facility: HOSPITAL | Age: 35
End: 2025-06-18
Payer: MEDICAID

## 2025-06-18 DIAGNOSIS — Z98.891 PREVIOUS CESAREAN SECTION: Primary | ICD-10-CM

## 2025-06-18 DIAGNOSIS — Z98.891 STATUS POST PRIMARY LOW TRANSVERSE CESAREAN SECTION: ICD-10-CM

## 2025-06-18 LAB
ABO GROUP BLD: NORMAL
DEPRECATED RDW RBC AUTO: 43.9 FL (ref 37–54)
ERYTHROCYTE [DISTWIDTH] IN BLOOD BY AUTOMATED COUNT: 14.9 % (ref 12.3–15.4)
HCT VFR BLD AUTO: 30.9 % (ref 34–46.6)
HGB BLD-MCNC: 10.3 G/DL (ref 12–15.9)
MCH RBC QN AUTO: 27.5 PG (ref 26.6–33)
MCHC RBC AUTO-ENTMCNC: 33.3 G/DL (ref 31.5–35.7)
MCV RBC AUTO: 82.6 FL (ref 79–97)
PLATELET # BLD AUTO: 169 10*3/MM3 (ref 140–450)
PMV BLD AUTO: 11.3 FL (ref 6–12)
RBC # BLD AUTO: 3.74 10*6/MM3 (ref 3.77–5.28)
RH BLD: NEGATIVE
WBC NRBC COR # BLD AUTO: 12.98 10*3/MM3 (ref 3.4–10.8)

## 2025-06-18 PROCEDURE — 85027 COMPLETE CBC AUTOMATED: CPT | Performed by: OBSTETRICS & GYNECOLOGY

## 2025-06-18 PROCEDURE — 25010000002 MORPHINE PER 10 MG: Performed by: ANESTHESIOLOGY

## 2025-06-18 PROCEDURE — 25810000003 LACTATED RINGERS PER 1000 ML: Performed by: OBSTETRICS & GYNECOLOGY

## 2025-06-18 PROCEDURE — C1755 CATHETER, INTRASPINAL: HCPCS

## 2025-06-18 PROCEDURE — 86901 BLOOD TYPING SEROLOGIC RH(D): CPT | Performed by: ADVANCED PRACTICE MIDWIFE

## 2025-06-18 PROCEDURE — 25010000002 FENTANYL CITRATE (PF) 50 MCG/ML SOLUTION: Performed by: ANESTHESIOLOGY

## 2025-06-18 PROCEDURE — 25010000002 ONDANSETRON PER 1 MG: Performed by: ANESTHESIOLOGY

## 2025-06-18 PROCEDURE — 85461 HEMOGLOBIN FETAL: CPT | Performed by: ADVANCED PRACTICE MIDWIFE

## 2025-06-18 PROCEDURE — 25010000002 MIDAZOLAM PER 1 MG: Performed by: ANESTHESIOLOGY

## 2025-06-18 PROCEDURE — 25010000002 BUPIVACAINE IN DEXTROSE 0.75-8.25 % SOLUTION: Performed by: ANESTHESIOLOGY

## 2025-06-18 PROCEDURE — 25810000003 LACTATED RINGERS SOLUTION: Performed by: OBSTETRICS & GYNECOLOGY

## 2025-06-18 PROCEDURE — 51703 INSERT BLADDER CATH COMPLEX: CPT

## 2025-06-18 PROCEDURE — 25010000002 CEFAZOLIN PER 500 MG: Performed by: OBSTETRICS & GYNECOLOGY

## 2025-06-18 PROCEDURE — 25010000002 KETOROLAC TROMETHAMINE PER 15 MG: Performed by: OBSTETRICS & GYNECOLOGY

## 2025-06-18 PROCEDURE — 25010000002 OXYTOCIN PER 10 UNITS: Performed by: ANESTHESIOLOGY

## 2025-06-18 PROCEDURE — 25010000002 METOCLOPRAMIDE PER 10 MG: Performed by: ANESTHESIOLOGY

## 2025-06-18 PROCEDURE — 25010000002 FAMOTIDINE (PF) 20 MG/2ML SOLUTION: Performed by: ANESTHESIOLOGY

## 2025-06-18 PROCEDURE — 86900 BLOOD TYPING SEROLOGIC ABO: CPT | Performed by: ADVANCED PRACTICE MIDWIFE

## 2025-06-18 PROCEDURE — 59025 FETAL NON-STRESS TEST: CPT

## 2025-06-18 RX ORDER — PROMETHAZINE HYDROCHLORIDE 25 MG/1
25 TABLET ORAL EVERY 6 HOURS PRN
Status: DISCONTINUED | OUTPATIENT
Start: 2025-06-18 | End: 2025-06-21 | Stop reason: HOSPADM

## 2025-06-18 RX ORDER — OXYCODONE HYDROCHLORIDE 10 MG/1
10 TABLET ORAL EVERY 4 HOURS PRN
Refills: 0 | Status: DISCONTINUED | OUTPATIENT
Start: 2025-06-18 | End: 2025-06-21 | Stop reason: HOSPADM

## 2025-06-18 RX ORDER — NALOXONE HCL 0.4 MG/ML
0.4 VIAL (ML) INJECTION
Status: ACTIVE | OUTPATIENT
Start: 2025-06-18 | End: 2025-06-19

## 2025-06-18 RX ORDER — METOCLOPRAMIDE HYDROCHLORIDE 5 MG/ML
INJECTION INTRAMUSCULAR; INTRAVENOUS AS NEEDED
Status: DISCONTINUED | OUTPATIENT
Start: 2025-06-18 | End: 2025-06-18 | Stop reason: SURG

## 2025-06-18 RX ORDER — METHYLERGONOVINE MALEATE 0.2 MG/ML
200 INJECTION INTRAVENOUS ONCE AS NEEDED
Status: DISCONTINUED | OUTPATIENT
Start: 2025-06-18 | End: 2025-06-18 | Stop reason: HOSPADM

## 2025-06-18 RX ORDER — PROMETHAZINE HYDROCHLORIDE 12.5 MG/1
12.5 TABLET ORAL EVERY 6 HOURS PRN
Status: DISCONTINUED | OUTPATIENT
Start: 2025-06-18 | End: 2025-06-18 | Stop reason: HOSPADM

## 2025-06-18 RX ORDER — KETOROLAC TROMETHAMINE 30 MG/ML
30 INJECTION, SOLUTION INTRAMUSCULAR; INTRAVENOUS ONCE
Status: COMPLETED | OUTPATIENT
Start: 2025-06-18 | End: 2025-06-18

## 2025-06-18 RX ORDER — OXYTOCIN/0.9 % SODIUM CHLORIDE 30/500 ML
85 PLASTIC BAG, INJECTION (ML) INTRAVENOUS ONCE
Status: COMPLETED | OUTPATIENT
Start: 2025-06-18 | End: 2025-06-18

## 2025-06-18 RX ORDER — SODIUM CHLORIDE 0.9 % (FLUSH) 0.9 %
3-10 SYRINGE (ML) INJECTION AS NEEDED
Status: DISCONTINUED | OUTPATIENT
Start: 2025-06-18 | End: 2025-06-21 | Stop reason: HOSPADM

## 2025-06-18 RX ORDER — ALUMINA, MAGNESIA, AND SIMETHICONE 2400; 2400; 240 MG/30ML; MG/30ML; MG/30ML
15 SUSPENSION ORAL EVERY 4 HOURS PRN
Status: DISCONTINUED | OUTPATIENT
Start: 2025-06-18 | End: 2025-06-21 | Stop reason: HOSPADM

## 2025-06-18 RX ORDER — KETOROLAC TROMETHAMINE 15 MG/ML
15 INJECTION, SOLUTION INTRAMUSCULAR; INTRAVENOUS EVERY 6 HOURS
Status: COMPLETED | OUTPATIENT
Start: 2025-06-19 | End: 2025-06-19

## 2025-06-18 RX ORDER — SODIUM CHLORIDE 0.9 % (FLUSH) 0.9 %
3 SYRINGE (ML) INJECTION EVERY 12 HOURS SCHEDULED
Status: DISCONTINUED | OUTPATIENT
Start: 2025-06-18 | End: 2025-06-21 | Stop reason: HOSPADM

## 2025-06-18 RX ORDER — HYDROCORTISONE 25 MG/G
1 CREAM TOPICAL AS NEEDED
Status: DISCONTINUED | OUTPATIENT
Start: 2025-06-18 | End: 2025-06-21 | Stop reason: HOSPADM

## 2025-06-18 RX ORDER — SODIUM CHLORIDE, SODIUM LACTATE, POTASSIUM CHLORIDE, CALCIUM CHLORIDE 600; 310; 30; 20 MG/100ML; MG/100ML; MG/100ML; MG/100ML
125 INJECTION, SOLUTION INTRAVENOUS CONTINUOUS
Status: ACTIVE | OUTPATIENT
Start: 2025-06-18 | End: 2025-06-20

## 2025-06-18 RX ORDER — MISOPROSTOL 200 UG/1
800 TABLET ORAL AS NEEDED
Status: DISCONTINUED | OUTPATIENT
Start: 2025-06-18 | End: 2025-06-18 | Stop reason: HOSPADM

## 2025-06-18 RX ORDER — BUPIVACAINE HCL/0.9 % NACL/PF 0.125 %
PLASTIC BAG, INJECTION (ML) EPIDURAL AS NEEDED
Status: DISCONTINUED | OUTPATIENT
Start: 2025-06-18 | End: 2025-06-18 | Stop reason: SURG

## 2025-06-18 RX ORDER — OXYTOCIN 10 [USP'U]/ML
INJECTION, SOLUTION INTRAMUSCULAR; INTRAVENOUS AS NEEDED
Status: DISCONTINUED | OUTPATIENT
Start: 2025-06-18 | End: 2025-06-18 | Stop reason: SURG

## 2025-06-18 RX ORDER — ACETAMINOPHEN 325 MG/1
650 TABLET ORAL EVERY 6 HOURS
Status: DISCONTINUED | OUTPATIENT
Start: 2025-06-19 | End: 2025-06-21 | Stop reason: HOSPADM

## 2025-06-18 RX ORDER — OXYTOCIN/0.9 % SODIUM CHLORIDE 30/500 ML
PLASTIC BAG, INJECTION (ML) INTRAVENOUS AS NEEDED
Status: DISCONTINUED | OUTPATIENT
Start: 2025-06-18 | End: 2025-06-18 | Stop reason: SURG

## 2025-06-18 RX ORDER — SODIUM CHLORIDE 9 MG/ML
40 INJECTION, SOLUTION INTRAVENOUS AS NEEDED
Status: DISCONTINUED | OUTPATIENT
Start: 2025-06-18 | End: 2025-06-21 | Stop reason: HOSPADM

## 2025-06-18 RX ORDER — SODIUM CHLORIDE 9 MG/ML
40 INJECTION, SOLUTION INTRAVENOUS AS NEEDED
Status: DISCONTINUED | OUTPATIENT
Start: 2025-06-18 | End: 2025-06-18 | Stop reason: HOSPADM

## 2025-06-18 RX ORDER — OXYCODONE AND ACETAMINOPHEN 5; 325 MG/1; MG/1
1 TABLET ORAL EVERY 4 HOURS PRN
Status: DISCONTINUED | OUTPATIENT
Start: 2025-06-18 | End: 2025-06-18 | Stop reason: HOSPADM

## 2025-06-18 RX ORDER — FAMOTIDINE 10 MG/ML
INJECTION, SOLUTION INTRAVENOUS AS NEEDED
Status: DISCONTINUED | OUTPATIENT
Start: 2025-06-18 | End: 2025-06-18 | Stop reason: SURG

## 2025-06-18 RX ORDER — OXYCODONE HYDROCHLORIDE 5 MG/1
5 TABLET ORAL EVERY 4 HOURS PRN
Refills: 0 | Status: DISCONTINUED | OUTPATIENT
Start: 2025-06-18 | End: 2025-06-21 | Stop reason: HOSPADM

## 2025-06-18 RX ORDER — LIDOCAINE HYDROCHLORIDE 10 MG/ML
0.5 INJECTION, SOLUTION EPIDURAL; INFILTRATION; INTRACAUDAL; PERINEURAL ONCE AS NEEDED
Status: DISCONTINUED | OUTPATIENT
Start: 2025-06-18 | End: 2025-06-18 | Stop reason: HOSPADM

## 2025-06-18 RX ORDER — MORPHINE SULFATE 0.5 MG/ML
INJECTION, SOLUTION EPIDURAL; INTRATHECAL; INTRAVENOUS AS NEEDED
Status: DISCONTINUED | OUTPATIENT
Start: 2025-06-18 | End: 2025-06-18 | Stop reason: SURG

## 2025-06-18 RX ORDER — DIPHENHYDRAMINE HCL 25 MG
25 CAPSULE ORAL EVERY 4 HOURS PRN
Status: DISCONTINUED | OUTPATIENT
Start: 2025-06-18 | End: 2025-06-21 | Stop reason: HOSPADM

## 2025-06-18 RX ORDER — MISOPROSTOL 200 UG/1
800 TABLET ORAL AS NEEDED
Status: DISCONTINUED | OUTPATIENT
Start: 2025-06-18 | End: 2025-06-21 | Stop reason: HOSPADM

## 2025-06-18 RX ORDER — CARBOPROST TROMETHAMINE 250 UG/ML
250 INJECTION, SOLUTION INTRAMUSCULAR AS NEEDED
Status: DISCONTINUED | OUTPATIENT
Start: 2025-06-18 | End: 2025-06-21 | Stop reason: HOSPADM

## 2025-06-18 RX ORDER — DOCUSATE SODIUM 100 MG/1
100 CAPSULE, LIQUID FILLED ORAL 2 TIMES DAILY PRN
Status: DISCONTINUED | OUTPATIENT
Start: 2025-06-18 | End: 2025-06-21 | Stop reason: HOSPADM

## 2025-06-18 RX ORDER — FENTANYL CITRATE 50 UG/ML
INJECTION, SOLUTION INTRAMUSCULAR; INTRAVENOUS AS NEEDED
Status: DISCONTINUED | OUTPATIENT
Start: 2025-06-18 | End: 2025-06-18 | Stop reason: SURG

## 2025-06-18 RX ORDER — CITRIC ACID/SODIUM CITRATE 334-500MG
30 SOLUTION, ORAL ORAL ONCE
Status: COMPLETED | OUTPATIENT
Start: 2025-06-18 | End: 2025-06-18

## 2025-06-18 RX ORDER — MIDAZOLAM HYDROCHLORIDE 1 MG/ML
INJECTION, SOLUTION INTRAMUSCULAR; INTRAVENOUS AS NEEDED
Status: DISCONTINUED | OUTPATIENT
Start: 2025-06-18 | End: 2025-06-18 | Stop reason: SURG

## 2025-06-18 RX ORDER — DIPHENHYDRAMINE HYDROCHLORIDE 50 MG/ML
25 INJECTION, SOLUTION INTRAMUSCULAR; INTRAVENOUS ONCE AS NEEDED
Status: DISCONTINUED | OUTPATIENT
Start: 2025-06-18 | End: 2025-06-21 | Stop reason: HOSPADM

## 2025-06-18 RX ORDER — METHYLERGONOVINE MALEATE 0.2 MG/ML
200 INJECTION INTRAVENOUS AS NEEDED
Status: DISCONTINUED | OUTPATIENT
Start: 2025-06-18 | End: 2025-06-21 | Stop reason: HOSPADM

## 2025-06-18 RX ORDER — SIMETHICONE 80 MG
80 TABLET,CHEWABLE ORAL 4 TIMES DAILY PRN
Status: DISCONTINUED | OUTPATIENT
Start: 2025-06-18 | End: 2025-06-21 | Stop reason: HOSPADM

## 2025-06-18 RX ORDER — IBUPROFEN 600 MG/1
600 TABLET, FILM COATED ORAL EVERY 6 HOURS
Status: DISCONTINUED | OUTPATIENT
Start: 2025-06-20 | End: 2025-06-21 | Stop reason: HOSPADM

## 2025-06-18 RX ORDER — ONDANSETRON 2 MG/ML
INJECTION INTRAMUSCULAR; INTRAVENOUS AS NEEDED
Status: DISCONTINUED | OUTPATIENT
Start: 2025-06-18 | End: 2025-06-18 | Stop reason: SURG

## 2025-06-18 RX ORDER — OXYTOCIN/0.9 % SODIUM CHLORIDE 30/500 ML
125 PLASTIC BAG, INJECTION (ML) INTRAVENOUS ONCE AS NEEDED
Status: DISCONTINUED | OUTPATIENT
Start: 2025-06-18 | End: 2025-06-21 | Stop reason: HOSPADM

## 2025-06-18 RX ORDER — DIPHENHYDRAMINE HYDROCHLORIDE 50 MG/ML
25 INJECTION, SOLUTION INTRAMUSCULAR; INTRAVENOUS EVERY 4 HOURS PRN
Status: DISCONTINUED | OUTPATIENT
Start: 2025-06-18 | End: 2025-06-21 | Stop reason: HOSPADM

## 2025-06-18 RX ORDER — CALCIUM CARBONATE 500 MG/1
1 TABLET, CHEWABLE ORAL EVERY 4 HOURS PRN
Status: DISCONTINUED | OUTPATIENT
Start: 2025-06-18 | End: 2025-06-21 | Stop reason: HOSPADM

## 2025-06-18 RX ORDER — BUPIVACAINE HYDROCHLORIDE 7.5 MG/ML
INJECTION, SOLUTION INTRASPINAL
Status: COMPLETED | OUTPATIENT
Start: 2025-06-18 | End: 2025-06-18

## 2025-06-18 RX ORDER — PROMETHAZINE HYDROCHLORIDE 12.5 MG/1
12.5 TABLET ORAL EVERY 4 HOURS PRN
Status: DISCONTINUED | OUTPATIENT
Start: 2025-06-18 | End: 2025-06-18 | Stop reason: SDUPTHER

## 2025-06-18 RX ORDER — ONDANSETRON 4 MG/1
4 TABLET, ORALLY DISINTEGRATING ORAL EVERY 6 HOURS PRN
Status: DISCONTINUED | OUTPATIENT
Start: 2025-06-18 | End: 2025-06-21 | Stop reason: HOSPADM

## 2025-06-18 RX ORDER — ACETAMINOPHEN 500 MG
1000 TABLET ORAL EVERY 6 HOURS
Status: COMPLETED | OUTPATIENT
Start: 2025-06-18 | End: 2025-06-19

## 2025-06-18 RX ORDER — ACETAMINOPHEN 500 MG
1000 TABLET ORAL ONCE
Status: COMPLETED | OUTPATIENT
Start: 2025-06-18 | End: 2025-06-18

## 2025-06-18 RX ORDER — OXYTOCIN/0.9 % SODIUM CHLORIDE 30/500 ML
650 PLASTIC BAG, INJECTION (ML) INTRAVENOUS ONCE
Status: COMPLETED | OUTPATIENT
Start: 2025-06-18 | End: 2025-06-18

## 2025-06-18 RX ORDER — SODIUM CHLORIDE 0.9 % (FLUSH) 0.9 %
10 SYRINGE (ML) INJECTION AS NEEDED
Status: DISCONTINUED | OUTPATIENT
Start: 2025-06-18 | End: 2025-06-18 | Stop reason: HOSPADM

## 2025-06-18 RX ORDER — ONDANSETRON 2 MG/ML
4 INJECTION INTRAMUSCULAR; INTRAVENOUS EVERY 6 HOURS PRN
Status: DISCONTINUED | OUTPATIENT
Start: 2025-06-18 | End: 2025-06-18 | Stop reason: HOSPADM

## 2025-06-18 RX ORDER — PROMETHAZINE HYDROCHLORIDE 12.5 MG/1
12.5 SUPPOSITORY RECTAL EVERY 6 HOURS PRN
Status: DISCONTINUED | OUTPATIENT
Start: 2025-06-18 | End: 2025-06-21 | Stop reason: HOSPADM

## 2025-06-18 RX ORDER — HYDROXYZINE HYDROCHLORIDE 25 MG/1
50 TABLET, FILM COATED ORAL EVERY 6 HOURS PRN
Status: DISCONTINUED | OUTPATIENT
Start: 2025-06-18 | End: 2025-06-21 | Stop reason: HOSPADM

## 2025-06-18 RX ORDER — SODIUM CHLORIDE 0.9 % (FLUSH) 0.9 %
10 SYRINGE (ML) INJECTION EVERY 12 HOURS SCHEDULED
Status: DISCONTINUED | OUTPATIENT
Start: 2025-06-18 | End: 2025-06-18 | Stop reason: HOSPADM

## 2025-06-18 RX ORDER — PRENATAL VIT/IRON FUM/FOLIC AC 27MG-0.8MG
1 TABLET ORAL DAILY
Status: DISCONTINUED | OUTPATIENT
Start: 2025-06-18 | End: 2025-06-21 | Stop reason: HOSPADM

## 2025-06-18 RX ORDER — CARBOPROST TROMETHAMINE 250 UG/ML
250 INJECTION, SOLUTION INTRAMUSCULAR AS NEEDED
Status: DISCONTINUED | OUTPATIENT
Start: 2025-06-18 | End: 2025-06-18 | Stop reason: HOSPADM

## 2025-06-18 RX ORDER — ONDANSETRON 4 MG/1
4 TABLET, ORALLY DISINTEGRATING ORAL EVERY 6 HOURS PRN
Status: DISCONTINUED | OUTPATIENT
Start: 2025-06-18 | End: 2025-06-18 | Stop reason: HOSPADM

## 2025-06-18 RX ORDER — ONDANSETRON 2 MG/ML
4 INJECTION INTRAMUSCULAR; INTRAVENOUS EVERY 6 HOURS PRN
Status: DISCONTINUED | OUTPATIENT
Start: 2025-06-18 | End: 2025-06-21 | Stop reason: HOSPADM

## 2025-06-18 RX ADMIN — ONDANSETRON 4 MG: 2 INJECTION INTRAMUSCULAR; INTRAVENOUS at 15:48

## 2025-06-18 RX ADMIN — SODIUM CHLORIDE, POTASSIUM CHLORIDE, SODIUM LACTATE AND CALCIUM CHLORIDE: 600; 310; 30; 20 INJECTION, SOLUTION INTRAVENOUS at 16:22

## 2025-06-18 RX ADMIN — ACETAMINOPHEN 1000 MG: 500 TABLET ORAL at 20:07

## 2025-06-18 RX ADMIN — ACETAMINOPHEN 1000 MG: 500 TABLET ORAL at 14:58

## 2025-06-18 RX ADMIN — SODIUM CITRATE AND CITRIC ACID MONOHYDRATE 30 ML: 500; 334 SOLUTION ORAL at 15:42

## 2025-06-18 RX ADMIN — OXYTOCIN 2 UNITS: 10 INJECTION INTRAVENOUS at 16:24

## 2025-06-18 RX ADMIN — MIDAZOLAM 1 MG: 1 INJECTION INTRAMUSCULAR; INTRAVENOUS at 15:49

## 2025-06-18 RX ADMIN — FAMOTIDINE 20 MG: 10 INJECTION, SOLUTION INTRAVENOUS at 15:48

## 2025-06-18 RX ADMIN — METOCLOPRAMIDE 10 MG: 5 INJECTION, SOLUTION INTRAMUSCULAR; INTRAVENOUS at 16:03

## 2025-06-18 RX ADMIN — SODIUM CHLORIDE, POTASSIUM CHLORIDE, SODIUM LACTATE AND CALCIUM CHLORIDE 125 ML/HR: 600; 310; 30; 20 INJECTION, SOLUTION INTRAVENOUS at 15:15

## 2025-06-18 RX ADMIN — OXYTOCIN 2 UNITS: 10 INJECTION INTRAVENOUS at 16:22

## 2025-06-18 RX ADMIN — Medication 85 ML/HR: at 18:28

## 2025-06-18 RX ADMIN — Medication 85 ML/HR: at 16:51

## 2025-06-18 RX ADMIN — MORPHINE SULFATE 0.15 MG: 0.5 INJECTION, SOLUTION EPIDURAL; INTRATHECAL; INTRAVENOUS at 15:54

## 2025-06-18 RX ADMIN — SODIUM CHLORIDE 2 G: 900 INJECTION INTRAVENOUS at 15:31

## 2025-06-18 RX ADMIN — Medication 500 ML: at 16:10

## 2025-06-18 RX ADMIN — BUPIVACAINE HYDROCHLORIDE IN DEXTROSE 1.2 ML: 7.5 INJECTION, SOLUTION SUBARACHNOID at 15:54

## 2025-06-18 RX ADMIN — SODIUM CHLORIDE, POTASSIUM CHLORIDE, SODIUM LACTATE AND CALCIUM CHLORIDE: 600; 310; 30; 20 INJECTION, SOLUTION INTRAVENOUS at 15:47

## 2025-06-18 RX ADMIN — OXYTOCIN 3 UNITS: 10 INJECTION INTRAVENOUS at 16:10

## 2025-06-18 RX ADMIN — OXYTOCIN 3 UNITS: 10 INJECTION INTRAVENOUS at 16:16

## 2025-06-18 RX ADMIN — KETOROLAC TROMETHAMINE 30 MG: 30 INJECTION, SOLUTION INTRAMUSCULAR; INTRAVENOUS at 17:49

## 2025-06-18 RX ADMIN — FENTANYL CITRATE 15 MCG: 50 INJECTION, SOLUTION INTRAMUSCULAR; INTRAVENOUS at 15:54

## 2025-06-18 RX ADMIN — SODIUM CHLORIDE, POTASSIUM CHLORIDE, SODIUM LACTATE AND CALCIUM CHLORIDE 1000 ML: 600; 310; 30; 20 INJECTION, SOLUTION INTRAVENOUS at 14:30

## 2025-06-18 RX ADMIN — Medication 200 MCG: at 16:04

## 2025-06-18 NOTE — ANESTHESIA PREPROCEDURE EVALUATION
Anesthesia Evaluation     Patient summary reviewed and Nursing notes reviewed   no history of anesthetic complications:   NPO Solid Status: > 8 hours  NPO Liquid Status: > 2 hours           Airway   Mallampati: II  Dental - normal exam     Pulmonary - negative pulmonary ROS   (-) recent URI  Cardiovascular - negative cardio ROS    Rhythm: regular  Rate: normal        Neuro/Psych- negative ROS  GI/Hepatic/Renal/Endo - negative ROS     Musculoskeletal (-) negative ROS    Abdominal  - normal exam   Substance History - negative use     OB/GYN    (+) Pregnant        Other - negative ROS       ROS/Med Hx Other: 06/17/25 08:32  WBC: 9.88  RBC: 4.28  Hemoglobin: 11.7 (L)  Hematocrit: 35.1  Platelets: 197      (L): Data is abnormally low              Anesthesia Plan    ASA 2     ITN and spinal       Anesthetic plan, risks, benefits, and alternatives have been provided, discussed and informed consent has been obtained with: patient (In person, hospital Ukranian ).  Pre-procedure education provided  Use of blood products discussed with patient  Consented to blood products.    Plan discussed with attending.    CODE STATUS:

## 2025-06-18 NOTE — OP NOTE
"[facility]   Section Operative Note    Pre-Operative Dx:   1.  IUP at 36w4d   2. Low lying placenta. Less than 2 cm from Cervical os  3. History of IUFD        Postoperative dx:    1.  Same     Procedure: Procedure(s):   SECTION PRIMARY   Surgeon/Assistant: Surgeon(s):  Mehreen Cheung MD         Anesthesia:  Anesthesiologist: Spinal  Anesthesiologist: Kelly Sanchez DO         QBL:  Not calculated at time of note  EBL 500mL        IV Fluids: 1250 mls.   UOP: 250 mls.    I/O this shift:  In: 1250 [I.V.:1250]  Out: 250 [Urine:250]   Antibiotics: cefazolin (Ancef)     Infant:           Gender: female infant    Weight: 3189 g (7 lb 0.5 oz)    Apgars:   @ 1 minute /       @ 5 minutes    Cord gases: Venous:  No results found for: \"PHCVEN\", \"BECVEN\"     Arterial:  No results found for: \"PHCART\", \"BECART\"     Indication for C/Section:  Low lying placenta     Priority for C/Section:  routine     Procedure Details:   The patient was taken to the operating room after risks and benefits have been reviewed. The consent was reviewed and had been signed. Time Out was performed. She was prepped and draped in the normal sterile fashion in the dorsal supine position and a leftward tilt.  A Pfannenstiel skin incision made 2 cm above the pubic symphysis and carried down to the underlying layer of fascia with the scalpel.  The fascia was nicked in the midline and the incision was extended laterally with Drake scissors.  The anterior aspect of the incision was grasped with Kocher clamps x2 and elevated and the underlying rectus muscles were dissected off sharply and bluntly.  The inferior aspect of the incision was treated similarly.  The peritoneum was identified and entered bluntly the incision was extended with lateral traction.  A bladder blade was inserted.  The lower uterine segment was identified and incised in a transverse fashion with the scalpel.  The uterine cavity was entered bluntly and the " incision was extended with cephalocaudad traction.  The fetus was then delivered atraumatically.  Cord was clamped and cut after 60 second delay and the infant was handed off to DRT staff for evaluation.  Cord blood and cord segment were obtained.  The placenta was delivered via uterine massage.  The uterus was then exteriorized and cleared of all clot and debris with clean laparotomy sponges.  The hysterotomy was closed in a single layer with a #1 chromic in a running locking fashion.  Hemostasis was noted.  The posterior cul-de-sac was irrigated and aspirated.  The hysterotomy was again inspected and noted to be hemostatic.  The uterus was returned to the abdomen.  The paracolic gutters were irrigated and aspirated.  Hysterotomy was inspected for third time and noted to be hemostatic.  The peritoneum was closed with 2-0 chromic in a running fashion.  The rectus muscles underlying the fascia were made hemostatic with Bovie electrocautery.  The fascia was then closed with 1 Vicryl in a running stitch.  The subcutaneous tissues were irrigated aspirated and made hemostatic with Bovie electrocautery.  The subcutaneous tissues were reapproximated with 3-0 Vicryl and the skin was closed with 4-0 Monocryl in a subcuticular fashion and sealed with Dermabond.  All counts were correct and the patient was taken to the recovery room in stable condition.         Complications:   None     Specimens: none     Disposition:   Mother to Mother Baby/Postpartum  in stable condition currently.   Baby to NBN  in stable condition currently.       Mehreen Cheung MD  6/18/2025  16:42 EDT

## 2025-06-18 NOTE — ANESTHESIA POSTPROCEDURE EVALUATION
Patient: Mendy Cote    Procedure Summary       Date: 25 Room / Location: [facility] LABOR DELIVERY   [facility] LABOR DELIVERY    Anesthesia Start: 1546 Anesthesia Stop:     Procedure:  SECTION PRIMARY (Abdomen) Diagnosis:     Surgeons: Mehreen Cheung MD Provider: Kelly Sanchez DO    Anesthesia Type: ITN, spinal ASA Status: 2            Anesthesia Type: ITN, spinal    Vitals  No vitals data found for the desired time range.  T 97.4  RR 15  SAT 93%  /50  HR 91        Post Anesthesia Care and Evaluation    Patient location during evaluation: bedside  Patient participation: complete - patient participated  Level of consciousness: awake and awake and alert  Pain score: 0  Pain management: satisfactory to patient    Airway patency: patent  Anesthetic complications: No anesthetic complications  PONV Status: none  Cardiovascular status: acceptable, hemodynamically stable and stable  Respiratory status: acceptable  Hydration status: stable  Post Neuraxial Block status: No signs or symptoms of PDPH

## 2025-06-18 NOTE — H&P
PRANAV Craven  Obstetric History and Physical    CC: PCD    SUBJECTIVE:     Patient is a 34 y.o. female  currently at 36w4d, who presents with pregnancy c/b grand multiparity, history of IUFD at 32 wk and LLP most recently evaluated last week. PCD recommended at 36w by MFM.   Has no acute complaints today       Prenatal Information:  Prenatal Results       Initial Prenatal Labs       Test Value Reference Range Date Time    Hemoglobin  12.7 g/dL 12.0 - 15.9 25 1054    Hematocrit  39.4 % 34.0 - 46.6 25 1054    Platelets  243 10*3/mm3 140 - 450 25 1054    Rubella IgG  <0.90 index Immune >0.99 25 1054    Hepatitis B SAg  Non-Reactive  Non-Reactive 25 1054    Hepatitis C Ab  Non-Reactive  Non-Reactive 25 1054    RPR        T. Pallidum Ab   Non-Reactive  Non-Reactive 25 0833       Non-Reactive  Non-Reactive 25 1054    ABO  B   25 0832    Rh  Negative   25 0832    Antibody Screen  Negative   25 1054    HIV  Non-Reactive  Non-Reactive 25 1054    Urine Culture  No growth   25 1054    Gonorrhea  Negative  Negative 25 1054    Chlamydia  Negative  Negative 25 1054    TSH        HgB A1c         Varicella IgG  Reactive  Non Reactive 25 1054    Hemoglobinopathy Fractionation        Hemoglobinopathy (genetic testing)        Cystic fibrosis         Spinal muscular atrophy        Fragile X                  Fetal testing        Test Value Reference Range Date Time    NIPT        MSAFP        AFP-4                  2nd and 3rd Trimester       Test Value Reference Range Date Time    Hemoglobin (repeated)  11.7 g/dL 12.0 - 15.9 25 0832    Hematocrit (repeated)  35.1 % 34.0 - 46.6 25 0832    Platelets   197 10*3/mm3 140 - 450 25 0832       243 10*3/mm3 140 - 450 25 1054    1 hour GTT         Antibody Screen (repeated)  Negative   25 0832    3rd TM syphilis scrn (repeated)  RPR         3rd TM syphilis scrn  (repeated) TP-Ab  Non-Reactive  Non-Reactive 06/17/25 0833    3rd TM syphilis screen TB-Ab (FTA)  Non-Reactive  Non-Reactive 06/17/25 0833    Syphilis cascade test TP-Ab (EIA)        Syphilis cascade TPPA        GTT Fasting        GTT 1 Hr        GTT 2 Hr        GTT 3 Hr        Group B Strep                  Other testing        Test Value Reference Range Date Time    Parvo IgG         CMV IgG                   Drug Screening       Test Value Reference Range Date Time    Amphetamine Screen  Negative  Negative 01/23/25 1054    Barbiturate Screen  Negative  Negative 01/23/25 1054    Benzodiazepine Screen  Negative  Negative 01/23/25 1054    Methadone Screen  Negative  Negative 01/23/25 1054    Phencyclidine Screen  Negative  Negative 01/23/25 1054    Opiates Screen  Negative  Negative 01/23/25 1054    THC Screen  Negative  Negative 01/23/25 1054    Cocaine Screen  Negative  Negative 01/23/25 1054    Propoxyphene Screen        Buprenorphine Screen  Negative  Negative 01/23/25 1054    Methamphetamine Screen  Negative  Negative 01/23/25 1054    Oxycodone Screen  Negative  Negative 01/23/25 1054    Tricyclic Antidepressants Screen  Negative  Negative 01/23/25 1054              Legend    ^: Historical                          External Prenatal Results       Pregnancy Outside Results - Transcribed From Office Records - See Scanned Records For Details       Test Value Date Time    ABO  B  06/17/25 0832    Rh  Negative  06/17/25 0832    Antibody Screen  Negative  06/17/25 0832       Negative  01/23/25 1054    Varicella IgG  Reactive  01/23/25 1054    Rubella  <0.90 index 01/23/25 1054    Hgb  11.7 g/dL 06/17/25 0832       12.7 g/dL 01/23/25 1054    Hct  35.1 % 06/17/25 0832       39.4 % 01/23/25 1054    HgB A1c        1h GTT       3h GTT Fasting       3h GTT 1 hour       3h GTT 2 hour       3h GTT 3 hour        Gonorrhea (discrete)  Negative  01/23/25 1054    Chlamydia (discrete)  Negative  01/23/25 1054    RPR       Syphils  cascade: TP-Ab (FTA)  Non-Reactive  25 0833       Non-Reactive  25 1054    TP-Ab  Non-Reactive  25 0833       Non-Reactive  25 1054    TP-Ab (EIA)       TPPA       HBsAg  Non-Reactive  25 1054    Herpes Simplex Virus PCR       Herpes Simplex VIrus Culture       HIV  Non-Reactive  25 1054    Hep C RNA Quant PCR       Hep C Antibody  Non-Reactive  25 1054    AFP       NIPT       Cystic Fibrosis (Cindy)       Cystic Fibroisis        Spinal Muscular atrophy       Fragile X       Group B Strep       GBS Susceptibility to Clindamycin       GBS Susceptibility to Erythromycin       Fetal Fibronectin       Genetic Testing, Maternal Blood                 Drug Screening       Test Value Date Time    Urine Drug Screen       Amphetamine Screen  Negative  25 1054    Barbiturate Screen  Negative  25 1054    Benzodiazepine Screen  Negative  25 1054    Methadone Screen  Negative  25 1054    Phencyclidine Screen  Negative  25 1054    Opiates Screen  Negative  25 1054    THC Screen  Negative  25 1054    Cocaine Screen       Propoxyphene Screen       Buprenorphine Screen  Negative  25 1054    Methamphetamine Screen       Oxycodone Screen  Negative  25 1054    Tricyclic Antidepressants Screen  Negative  25 1054              Legend    ^: Historical                             OB History:                     OB History    Para Term  AB Living   9 5 4 1 4 4   SAB IAB Ectopic Molar Multiple Live Births   4 0 0 0 1 4      # Outcome Date GA Lbr Jae/2nd Weight Sex Type Anes PTL Lv   9 Current            8  22 32w4d 04:37 / 00:11 1871 g (4 lb 2 oz) F Vag-Breech EPI N FD      Name: CARMENWILLARDLUCYDIANNE FD      Apgar1: 0  Apgar5: 0   7 Term 19 39w6d  3500 g (7 lb 11.5 oz) M Vag-Spont EPI N KARI      Complications: Fetal ventricular septal defect affecting antepartum care of mother      Name: Brandon       Apgar1:  8  Apgar5: 9   6 Term 07/29/17   2466 g (5 lb 7 oz) F Vag-Spont   KARI      Name: Brittaneyitta   5 SAB 2016           4A Term 05/17/15   2722 g (6 lb) F Vag-Spont   KARI      Name: Marielos   4B SAB            3 SAB 2014           2 Term 03/07/13   3175 g (7 lb) M Vag-Spont   KARI      Name: Eze   1 SAB 2011              Obstetric Comments   2013 - Samuil   2015 - Marielos   2017 - Anitta   2019 - Brandon    2022- IUFD      Allergies:                      No Known Allergies   Past Medical History: Past Medical History:   Diagnosis Date    Varicose veins of ankle     LEFT ANKLY AND GROIN      Past Surgical History Past Surgical History:   Procedure Laterality Date    [facility]  [facility]    [facility] C WITH SUCTION  2016    D & C WITH SUCTION  2014      Family History: Family History   Problem Relation Age of Onset    Heart disease Father     Breast cancer Neg Hx     Ovarian cancer Neg Hx       Social History:  reports that she has never smoked. She has never used smokeless tobacco.   reports no history of alcohol use.   reports no history of drug use.    General ROS: Pertinent items are noted in HPI, all other systems reviewed and negative   Medications: Prenatal Vit-Fe Fumarate-FA       Objective       Vital Signs Range for the last 24 hours  Temperature: Temp:  [98.6 °F (37 °C)] 98.6 °F (37 °C)   BP:     Pulse:     Respirations: Resp:  [16] 16   SPO2:                 Physical Examination: General appearance - alert, well appearing, and in no distress  Chest - clear to auscultation, no wheezes, rales or rhonchi, symmetric air entry  Heart - normal rate, regular rhythm, normal S1, S2, no murmurs, rubs, clicks or gallops  Abdomen - Soft, gravid, nontender  Extremities - pedal edema tr +        Fetal Heart Rate Assessment           Baseline:  normal   Variability:  moderate   Accels:  present   Decels:  absent   Tracing Category:       Uterine Assessment   Method:     Frequency (min):     Ctx Count in 10  min:       Laboratory Results:  CBC:      Lab 06/17/25  0832   WBC 9.88   HEMOGLOBIN 11.7*   HEMATOCRIT 35.1   PLATELETS 197   MCV 82.0              Assessment/Plan:   IUP 36w4d with LLP     1.Admit and proceed with PCD  2.Ancef, Bicitra  3.FWB reassuring      Mehreen Cheung MD  6/18/2025  14:23 EDT

## 2025-06-19 PROBLEM — Z34.90 TERM PREGNANCY: Status: RESOLVED | Noted: 2025-06-10 | Resolved: 2025-06-19

## 2025-06-19 PROBLEM — Z98.891 STATUS POST PRIMARY LOW TRANSVERSE CESAREAN SECTION: Status: ACTIVE | Noted: 2025-06-19

## 2025-06-19 LAB
BASOPHILS # BLD AUTO: 0.03 10*3/MM3 (ref 0–0.2)
BASOPHILS NFR BLD AUTO: 0.2 % (ref 0–1.5)
DEPRECATED RDW RBC AUTO: 44.7 FL (ref 37–54)
EOSINOPHIL # BLD AUTO: 0.13 10*3/MM3 (ref 0–0.4)
EOSINOPHIL NFR BLD AUTO: 1 % (ref 0.3–6.2)
ERYTHROCYTE [DISTWIDTH] IN BLOOD BY AUTOMATED COUNT: 15 % (ref 12.3–15.4)
FETAL BLEED: NEGATIVE
HCT VFR BLD AUTO: 31.2 % (ref 34–46.6)
HGB BLD-MCNC: 10.3 G/DL (ref 12–15.9)
IMM GRANULOCYTES # BLD AUTO: 0.08 10*3/MM3 (ref 0–0.05)
IMM GRANULOCYTES NFR BLD AUTO: 0.6 % (ref 0–0.5)
LYMPHOCYTES # BLD AUTO: 2.01 10*3/MM3 (ref 0.7–3.1)
LYMPHOCYTES NFR BLD AUTO: 15.3 % (ref 19.6–45.3)
MCH RBC QN AUTO: 27.5 PG (ref 26.6–33)
MCHC RBC AUTO-ENTMCNC: 33 G/DL (ref 31.5–35.7)
MCV RBC AUTO: 83.2 FL (ref 79–97)
MONOCYTES # BLD AUTO: 0.87 10*3/MM3 (ref 0.1–0.9)
MONOCYTES NFR BLD AUTO: 6.6 % (ref 5–12)
NEUTROPHILS NFR BLD AUTO: 10.03 10*3/MM3 (ref 1.7–7)
NEUTROPHILS NFR BLD AUTO: 76.3 % (ref 42.7–76)
NRBC BLD AUTO-RTO: 0 /100 WBC (ref 0–0.2)
NUMBER OF DOSES: NORMAL
PLATELET # BLD AUTO: 188 10*3/MM3 (ref 140–450)
PMV BLD AUTO: 11.6 FL (ref 6–12)
RBC # BLD AUTO: 3.75 10*6/MM3 (ref 3.77–5.28)
WBC NRBC COR # BLD AUTO: 13.15 10*3/MM3 (ref 3.4–10.8)

## 2025-06-19 PROCEDURE — 25010000002 RHO D IMMUNE GLOBULIN 1500 UNIT/2ML SOLUTION PREFILLED SYRINGE: Performed by: ADVANCED PRACTICE MIDWIFE

## 2025-06-19 PROCEDURE — 85025 COMPLETE CBC W/AUTO DIFF WBC: CPT | Performed by: OBSTETRICS & GYNECOLOGY

## 2025-06-19 PROCEDURE — 25010000002 KETOROLAC TROMETHAMINE PER 15 MG: Performed by: OBSTETRICS & GYNECOLOGY

## 2025-06-19 RX ADMIN — KETOROLAC TROMETHAMINE 15 MG: 15 INJECTION, SOLUTION INTRAMUSCULAR; INTRAVENOUS at 06:47

## 2025-06-19 RX ADMIN — KETOROLAC TROMETHAMINE 15 MG: 15 INJECTION, SOLUTION INTRAMUSCULAR; INTRAVENOUS at 12:07

## 2025-06-19 RX ADMIN — OXYCODONE 5 MG: 5 TABLET ORAL at 15:03

## 2025-06-19 RX ADMIN — HUMAN RHO(D) IMMUNE GLOBULIN 1500 UNITS: 1500 SOLUTION INTRAMUSCULAR; INTRAVENOUS at 06:47

## 2025-06-19 RX ADMIN — PRENATAL VITAMINS-IRON FUMARATE 27 MG IRON-FOLIC ACID 0.8 MG TABLET 1 TABLET: at 08:08

## 2025-06-19 RX ADMIN — ACETAMINOPHEN 650 MG: 325 TABLET, FILM COATED ORAL at 20:44

## 2025-06-19 RX ADMIN — ACETAMINOPHEN 1000 MG: 500 TABLET ORAL at 14:59

## 2025-06-19 RX ADMIN — KETOROLAC TROMETHAMINE 15 MG: 15 INJECTION, SOLUTION INTRAMUSCULAR; INTRAVENOUS at 17:43

## 2025-06-19 RX ADMIN — ACETAMINOPHEN 1000 MG: 500 TABLET ORAL at 08:08

## 2025-06-19 RX ADMIN — ACETAMINOPHEN 1000 MG: 500 TABLET ORAL at 03:54

## 2025-06-19 RX ADMIN — KETOROLAC TROMETHAMINE 15 MG: 15 INJECTION, SOLUTION INTRAMUSCULAR; INTRAVENOUS at 00:59

## 2025-06-19 NOTE — LACTATION NOTE
06/19/25 1635   Maternal Information   Date of Referral 06/19/25   Person Making Referral physician  (Baby transferred to NICU. Mom Ukranian and very little English. No avaliable inperson interpreters and  cart not working. Used [employer]  on phone and were able to get across the basics of pumping, milk storage, and breast care.)   Maternal Reason for Referral   ( other babies for more than a year.)   Infant Reason for Referral 35-37 weeks gestation;NICU admission  (Baby just transferred to NICU.)   Maternal Assessment   Breast Size Issue none   Breast Shape Bilateral:;round   Breast Density Bilateral:;soft   Nipples Bilateral:;everted   Left Nipple Symptoms intact;nontender   Right Nipple Symptoms intact;nontender   Milk Expression/Equipment   Breast Pump Type double electric, hospital grade  (Initiated pumping with hospital pump.)   Breast Pump Flange Type hard   Breast Pump Flange Size 24 mm   Breast Pumping   Breast Pumping Interventions early pumping promoted;frequent pumping encouraged  (To pump every 3 hours to get best milk supply.)     Mom will need additional teaching tomorrow when in-person  here. Mom has requested home spectra pump through her insurance and will need to go over this as well tomorrow.

## 2025-06-19 NOTE — LACTATION NOTE
"   06/19/25 1630   Maternal Information   Date of Referral 06/19/25   Person Making Referral physician   Maternal Reason for Referral   ( other babies for over a year.)   Infant Reason for Referral NICU admission  (baby trasnferred to NICU this afternoon)   Maternal Assessment   Breast Size Issue none   Breast Shape Bilateral:;round   Breast Density Bilateral:;soft   Nipples Bilateral:;everted   Left Nipple Symptoms intact;nontender   Right Nipple Symptoms intact;nontender   Milk Expression/Equipment   Breast Pump Type double electric, hospital grade  (Initiated pumping with hospital pump.  Instructed on microwave steam bag use and importance of sterilizing pump parts every 24 hours while babies are in NICU.  Discussed pump loan program.)   Breast Pump Flange Type hard   Breast Pump Flange Size 24 mm   Breast Pumping   Breast Pumping Interventions early pumping promoted;frequent pumping encouraged  (Encouraged to pump every 3 hours to get best milk supply possible.  Mom has syringes and knows how to pull up small volumes of colostrum.)     Teaching documented under Education. Encouraged to watch [employer] online videos, \"Maximizing Milk Expression\" and \"Hand Expression. These demonstrate using hands to help with milk expression. Encouraged to call lactation services, if there are questions or concerns or if mom wants help with pumping.     Live  not available today.  cart not working. Used patient [employer]  on patient's phone. It will be good to go over all teaching again with live  tomorrow. Will need to go over pumping information for NICU baby an how to use personal pump.   "

## 2025-06-19 NOTE — PROGRESS NOTES
2025    Name:Mendy Cote    MR#:[id number]     PROGRESS NOTE:  Post-Op 1 S/P        Subjective   34 y.o. yo Female  s/p CS at 36w4d doing well. Pain well controlled, lochia appropriate. Infant doing well in NICU (low platelets).      Status post primary low transverse  section        Objective    Vitals  Temp:  Temp:  [97.6 °F (36.4 °C)-98.6 °F (37 °C)] 97.9 °F (36.6 °C)  Temp src: Oral  BP:  BP: ()/(50-78) 119/56  Pulse:  Heart Rate:  [44-91] 68  RR:   Resp:  [16] 16    General Awake, alert, no distress  Abdomen Soft, non-distended, fundus firm, below umbilicus, appropriately tender  Incision  Intact, no erythema or exudate  Extremities Calves NT bilaterally     I/O last 3 completed shifts:  In: 1250 [I.V.:1250]  Out: 2008 [Urine:1625; Blood:383]    Lab Results   Component Value Date    WBC 13.15 (H) 2025    HGB 10.3 (L) 2025    HCT 31.2 (L) 2025    MCV 83.2 2025     2025    CREATININE 0.57 10/23/2023    AST 13 10/23/2023    ALT 8 10/23/2023    HEPBSAG Non-Reactive 2025     Results from last 7 days   Lab Units 25  2212 25  0832   ABO TYPING  B B   RH TYPING  Negative Negative   ANTIBODY SCREEN   --  Negative       Infant: female      Assessment   1.  POD 1 from  Section    Plan: Doing well.    D/C iv, advance diet, may shower.          Chicho Vargas CNM  2025 09:18 EDT

## 2025-06-19 NOTE — LACTATION NOTE
06/19/25 0925   Maternal Information   Date of Referral 06/19/25   Person Making Referral lactation consultant  (Courtesy consult)   Maternal Reason for Referral   (Breast-fed her other for babies for more than a year.)   Infant Reason for Referral   (Reports baby is breast-feeding well.  Encouraged to call for lactation services if there are questions or concerns, or if she wants help breast-feeding.)

## 2025-06-19 NOTE — PAYOR COMM NOTE
"Mendy Elizabeth (34 y.o. Female)     From:Luciana Davila LPN, Utilization Review  Phone #[phone]  Fax #[phone]    [facility] Ref#[id number]     Delivery Information:  C Section 6/18/25  Wt 3189 gms  Female  Apgars 7/8          Date of Birth   1990    Social Security Number       Address   [address] [place] [address]    Home Phone   [phone]    MRN   [id number]       Rastafari   Mormonism    Marital Status                               Admission Date   6/18/2025    Admission Type   Elective    Admitting Provider   Mehreen Cheung MD    Attending Provider   Chicho Vargas CNM    Department, Room/Bed   [facility] MOTHER BABY 4A, N410/1       Discharge Date       Discharge Disposition       Discharge Destination                                 Attending Provider: Chicho Vargas CNM    Allergies: No Known Allergies    Isolation: None   Infection: None   Code Status: CPR    Ht: 154.9 cm (61\")   Wt: 70.4 kg (155 lb 3.3 oz)    Admission Cmt: None   Principal Problem: Term pregnancy [Z34.90]                   Active Insurance as of 6/18/2025       Primary Coverage       Payor Plan Insurance Group Employer/Plan Group    [facility] PLAN [facility]       Payor Plan Address Payor Plan Phone Number Payor Plan Fax Number Effective Dates    [address]   1/1/2025 - None Entered    [facility] [address]         Subscriber Name Subscriber Birth Date Member ID       MENDY ELIZABETH 1990 [id number]                     Emergency Contacts        (Rel.) Home Phone Work Phone Mobile Phone    DOYLE ELIZABETHKSNYAAN (Spouse) [phone] -- [phone]    Sasha Gutierrez (Friend) -- -- [phone]              Insurance Information                  [facility] COMMUNITY PLAN [facility] Phone: --    Subscriber: Mendy Elizabeth Subscriber#: [id number]    Group#: [id number] Precert#: --    Authorization#: [id number] " Effective Date: --             History & Physical        Mehreen Cheung MD at 25 1422          [facility]  Obstetric History and Physical    CC: PCD    SUBJECTIVE:     Patient is a 34 y.o. female  currently at 36w4d, who presents with pregnancy c/b grand multiparity, history of IUFD at 32 wk and LLP most recently evaluated last week. PCD recommended at 36w by [facility].   Has no acute complaints today       Prenatal Information:  Prenatal Results       Initial Prenatal Labs       Test Value Reference Range Date Time    Hemoglobin  12.7 g/dL 12.0 - 15.9 25 1054    Hematocrit  39.4 % 34.0 - 46.6 25 1054    Platelets  243 10*3/mm3 140 - 450 25 1054    Rubella IgG  <0.90 index Immune >0.99 25 1054    Hepatitis B SAg  Non-Reactive  Non-Reactive 25 1054    Hepatitis C Ab  Non-Reactive  Non-Reactive 25 1054    RPR        T. Pallidum Ab   Non-Reactive  Non-Reactive 25 0833       Non-Reactive  Non-Reactive 25 1054    ABO  B   25 0832    Rh  Negative   25 0832    Antibody Screen  Negative   25 1054    HIV  Non-Reactive  Non-Reactive 25 1054    Urine Culture  No growth   25 1054    Gonorrhea  Negative  Negative 25 1054    Chlamydia  Negative  Negative 25 1054    TSH        HgB A1c         Varicella IgG  Reactive  Non Reactive 25 1054    Hemoglobinopathy Fractionation        Hemoglobinopathy (genetic testing)        Cystic fibrosis         Spinal muscular atrophy        Fragile X                  Fetal testing        Test Value Reference Range Date Time    NIPT        MSAFP        AFP-4                  2nd and 3rd Trimester       Test Value Reference Range Date Time    Hemoglobin (repeated)  11.7 g/dL 12.0 - 15.9 25 0832    Hematocrit (repeated)  35.1 % 34.0 - 46.6 25 0832    Platelets   197 10*3/mm3 140 - 450 25 0832       243 10*3/mm3 140 - 450 25 1054    1 hour GTT         Antibody Screen  (repeated)  Negative   06/17/25 0832    3rd TM syphilis scrn (repeated)  RPR         3rd TM syphilis scrn (repeated) TP-Ab  Non-Reactive  Non-Reactive 06/17/25 0833    3rd TM syphilis screen TB-Ab (FTA)  Non-Reactive  Non-Reactive 06/17/25 0833    Syphilis cascade test TP-Ab (EIA)        Syphilis cascade TPPA        GTT Fasting        GTT 1 Hr        GTT 2 Hr        GTT 3 Hr        Group B Strep                  Other testing        Test Value Reference Range Date Time    Parvo IgG         CMV IgG                   Drug Screening       Test Value Reference Range Date Time    Amphetamine Screen  Negative  Negative 01/23/25 1054    Barbiturate Screen  Negative  Negative 01/23/25 1054    Benzodiazepine Screen  Negative  Negative 01/23/25 1054    Methadone Screen  Negative  Negative 01/23/25 1054    Phencyclidine Screen  Negative  Negative 01/23/25 1054    Opiates Screen  Negative  Negative 01/23/25 1054    THC Screen  Negative  Negative 01/23/25 1054    Cocaine Screen  Negative  Negative 01/23/25 1054    Propoxyphene Screen        Buprenorphine Screen  Negative  Negative 01/23/25 1054    Methamphetamine Screen  Negative  Negative 01/23/25 1054    Oxycodone Screen  Negative  Negative 01/23/25 1054    Tricyclic Antidepressants Screen  Negative  Negative 01/23/25 1054              Legend    ^: Historical                          External Prenatal Results       Pregnancy Outside Results - Transcribed From Office Records - See Scanned Records For Details       Test Value Date Time    ABO  B  06/17/25 0832    Rh  Negative  06/17/25 0832    Antibody Screen  Negative  06/17/25 0832       Negative  01/23/25 1054    Varicella IgG  Reactive  01/23/25 1054    Rubella  <0.90 index 01/23/25 1054    Hgb  11.7 g/dL 06/17/25 0832       12.7 g/dL 01/23/25 1054    Hct  35.1 % 06/17/25 0832       39.4 % 01/23/25 1054    HgB A1c        1h GTT       3h GTT Fasting       3h GTT 1 hour       3h GTT 2 hour       3h GTT 3 hour        Gonorrhea  (discrete)  Negative  25 1054    Chlamydia (discrete)  Negative  25 1054    RPR       Syphils cascade: TP-Ab (FTA)  Non-Reactive  25 0833       Non-Reactive  25 1054    TP-Ab  Non-Reactive  25 0833       Non-Reactive  25 1054    TP-Ab (EIA)       TPPA       HBsAg  Non-Reactive  25 1054    Herpes Simplex Virus PCR       Herpes Simplex VIrus Culture       HIV  Non-Reactive  25 1054    Hep C RNA Quant PCR       Hep C Antibody  Non-Reactive  25 1054    AFP       NIPT       Cystic Fibrosis (Cindy)       Cystic Fibroisis        Spinal Muscular atrophy       Fragile X       Group B Strep       GBS Susceptibility to Clindamycin       GBS Susceptibility to Erythromycin       Fetal Fibronectin       Genetic Testing, Maternal Blood                 Drug Screening       Test Value Date Time    Urine Drug Screen       Amphetamine Screen  Negative  25 1054    Barbiturate Screen  Negative  25 1054    Benzodiazepine Screen  Negative  25 1054    Methadone Screen  Negative  25 1054    Phencyclidine Screen  Negative  25 1054    Opiates Screen  Negative  25 1054    THC Screen  Negative  25 1054    Cocaine Screen       Propoxyphene Screen       Buprenorphine Screen  Negative  25 1054    Methamphetamine Screen       Oxycodone Screen  Negative  25 1054    Tricyclic Antidepressants Screen  Negative  25 1054              Legend    ^: Historical                             OB History:                     OB History    Para Term  AB Living   9 5 4 1 4 4   SAB IAB Ectopic Molar Multiple Live Births   4 0 0 0 1 4      # Outcome Date GA Lbr Jae/2nd Weight Sex Type Anes PTL Lv   9 Current            8  22 32w4d 04:37 / 00:11 1871 g (4 lb 2 oz) F Vag-Breech EPI N FD      Name: DIANNE ELIZABETH FD      Apgar1: 0  Apgar5: 0   7 Term 19 39w6d  3500 g (7 lb 11.5 oz) M Vag-Spont EPI N KARI       Complications: Fetal ventricular septal defect affecting antepartum care of mother      Name: Brandon       Apgar1: 8  Apgar5: 9   6 Term 07/29/17   2466 g (5 lb 7 oz) F Vag-Spont   KARI      Name: Johanna   5 SAB 2016           4A Term 05/17/15   2722 g (6 lb) F Vag-Spont   KARI      Name: Marielos   4B SAB            3 SAB 2014           2 Term 03/07/13   3175 g (7 lb) M Vag-Spont   KARI      Name: Eze   1 SAB 2011              Obstetric Comments   2013 - Eze   2015 - Marielos   2017 - Johanna   2019 - Brandon    2022- IUFD      Allergies:                      No Known Allergies   Past Medical History: Past Medical History:   Diagnosis Date    Varicose veins of ankle     LEFT ANKLY AND GROIN      Past Surgical History Past Surgical History:   Procedure Laterality Date    D & C WITH SUCTION  2011    Done in [facility]    D & C WITH SUCTION  2016    D & C WITH SUCTION  2014      Family History: Family History   Problem Relation Age of Onset    Heart disease Father     Breast cancer Neg Hx     Ovarian cancer Neg Hx       Social History:  reports that she has never smoked. She has never used smokeless tobacco.   reports no history of alcohol use.   reports no history of drug use.    General ROS: Pertinent items are noted in HPI, all other systems reviewed and negative   Medications: Prenatal Vit-Fe Fumarate-FA       Objective      Vital Signs Range for the last 24 hours  Temperature: Temp:  [98.6 °F (37 °C)] 98.6 °F (37 °C)   BP:     Pulse:     Respirations: Resp:  [16] 16   SPO2:                 Physical Examination: General appearance - alert, well appearing, and in no distress  Chest - clear to auscultation, no wheezes, rales or rhonchi, symmetric air entry  Heart - normal rate, regular rhythm, normal S1, S2, no murmurs, rubs, clicks or gallops  Abdomen - Soft, gravid, nontender  Extremities - pedal edema tr +        Fetal Heart Rate Assessment           Baseline:  normal   Variability:  moderate   Accels:  present    Decels:  absent   Tracing Category:       Uterine Assessment   Method:     Frequency (min):     Ctx Count in 10 min:       Laboratory Results:  CBC:      Lab 06/17/25  0832   WBC 9.88   HEMOGLOBIN 11.7*   HEMATOCRIT 35.1   PLATELETS 197   MCV 82.0              Assessment/Plan:   IUP 36w4d with LLP     1.Admit and proceed with PCD  2.Ancef, Bicitra  3.FWB reassuring      Mehreen Cheung MD  6/18/2025  14:23 EDT      Electronically signed by Mehreen Cheung MD at 06/18/25 1533       H&P signed by New Onbase, [place] at 06/13/25 0735         [Media Unavailable] Scan on 6/13/2025 0734 by [employer] On[employer], [place]: PRENATAL RECORDS          Electronically signed by New Onbase, [place] at 06/13/25 0735       H&P signed by New Onbase, [place] at 06/09/25 1335         [Media Unavailable] Scan on 6/9/2025 1334 by [employer] On[employer], [place]: LESS THEN 39WKS          Electronically signed by New Onbase, [place] at 06/09/25 1335       Facility-Administered Medications as of 6/19/2025   Medication Dose Route Frequency Provider Last Rate Last Admin    [COMPLETED] acetaminophen (TYLENOL) tablet 1,000 mg  1,000 mg Oral Once Mehreen Cheung MD   1,000 mg at 06/18/25 1458    acetaminophen (TYLENOL) tablet 1,000 mg  1,000 mg Oral Q6H Mehreen Cheung MD   1,000 mg at 06/19/25 0808    Followed by    acetaminophen (TYLENOL) tablet 650 mg  650 mg Oral Q6H Mehreen Cheung MD        aluminum-magnesium hydroxide-simethicone (MAALOX MAX) 400-400-40 MG/5ML suspension 15 mL  15 mL Oral Q4H PRN Mehreen Cheung MD        Or    calcium carbonate (TUMS) chewable tablet 500 mg (200 mg elemental)  1 tablet Oral Q4H PRN Mehreen Cheung MD        carboprost (HEMABATE) injection 250 mcg  250 mcg Intramuscular PRN Mehreen Cheung MD        [COMPLETED] ceFAZolin 2000 mg IVPB in 100 mL NS (MBP)  2 g Intravenous Once Mehreen Cheung MD   2 g at 06/18/25 1531    diphenhydrAMINE (BENADRYL) capsule 25 mg  25 mg Oral Q4H PRN Beronica Palomino, CRNA         Or    diphenhydrAMINE (BENADRYL) injection 25 mg  25 mg Intravenous Once PRN Beronica Palomino CRNA        Or    diphenhydrAMINE (BENADRYL) injection 25 mg  25 mg Intramuscular Q4H PRN Beronica Palomino CRNA        docusate sodium (COLACE) capsule 100 mg  100 mg Oral BID PRN Mehreen Cheung MD        Hydrocortisone (Perianal) (ANUSOL-HC) 2.5 % rectal cream 1 Application  1 Application Rectal PRN Mehreen Cheung MD        hydrOXYzine (ATARAX) tablet 50 mg  50 mg Oral Q6H PRN Mehreen Cheung MD        ketorolac (TORADOL) injection 15 mg  15 mg Intravenous Q6H Mehreen Cheung MD   15 mg at 06/19/25 0647    Followed by    [START ON 6/20/2025] ibuprofen (ADVIL,MOTRIN) tablet 600 mg  600 mg Oral Q6H Mehreen Cheung MD        [COMPLETED] ketorolac (TORADOL) injection 30 mg  30 mg Intravenous Once Mehreen Cheung MD   30 mg at 06/18/25 1749    [COMPLETED] lactated ringers bolus 1,000 mL  1,000 mL Intravenous Once Mehreen Cheung MD 4,000 mL/hr at 06/18/25 1430 1,000 mL at 06/18/25 1430    lactated ringers infusion  125 mL/hr Intravenous Continuous Mehreen Cheung  mL/hr at 06/18/25 1546 New Bag at 06/18/25 1622    lanolin topical 1 Application  1 Application Topical Q1H PRN Mehreen Cheung MD        methylergonovine (METHERGINE) injection 200 mcg  200 mcg Intramuscular PRN Mehreen Cheung MD        miSOPROStol (CYTOTEC) tablet 800 mcg  800 mcg Rectal PRN Mehreen Cheung MD        naloxone (NARCAN) injection 0.4 mg  0.4 mg Intravenous Q1H PRN Beronica Palomino CRNA        ondansetron ODT (ZOFRAN-ODT) disintegrating tablet 4 mg  4 mg Oral Q6H PRN Mehreen Cheung MD        Or    ondansetron (ZOFRAN) injection 4 mg  4 mg Intravenous Q6H PRN Mehreen Cheung MD        oxyCODONE (ROXICODONE) immediate release tablet 5 mg  5 mg Oral Q4H PRN Mehreen Cheung MD        Or    oxyCODONE (ROXICODONE) immediate release tablet 10 mg  10 mg Oral Q4H PRN Mehreen Cheung MD        [COMPLETED]  "oxytocin (PITOCIN) 30 units in 0.9% sodium chloride 500 mL (premix)  650 mL/hr Intravenous Once Mehreen Cheung MD 85 mL/hr at 06/18/25 1828 85 mL/hr at 06/18/25 1828    Followed by    [COMPLETED] oxytocin (PITOCIN) 30 units in 0.9% sodium chloride 500 mL (premix)  85 mL/hr Intravenous Once Mehreen Cheung MD 85 mL/hr at 06/18/25 1651 85 mL/hr at 06/18/25 1651    oxytocin (PITOCIN) 30 units in 0.9% sodium chloride 500 mL (premix)  125 mL/hr Intravenous Once PRN Mehreen Cheung MD        prenatal vitamin tablet 1 tablet  1 tablet Oral Daily Mehreen Cheung MD   1 tablet at 06/19/25 0808    promethazine (PHENERGAN) tablet 25 mg  25 mg Oral Q6H PRN Mehreen Cheung MD        Or    promethazine (PHENERGAN) suppository 12.5 mg  12.5 mg Rectal Q6H PRN Mehreen Cheung MD        [COMPLETED] Rho D Immune Globulin (RHOPHYLAC) injection 1,500 Units  1,500 Units Intramuscular Once PRN Chicho Vargas CNM   1,500 Units at 06/19/25 0647    simethicone (MYLICON) chewable tablet 80 mg  80 mg Oral 4x Daily PRN Mehreen Cheung MD        [COMPLETED] Sod Citrate-Citric Acid (BICITRA) oral solution 30 mL  30 mL Oral Once Mehreen Cheung MD   30 mL at 06/18/25 1542    sodium chloride 0.9 % flush 3 mL  3 mL Intravenous Q12H Mehreen Cheung MD        sodium chloride 0.9 % flush 3-10 mL  3-10 mL Intravenous PRN Mehreen Cheung MD        sodium chloride 0.9 % infusion 40 mL  40 mL Intravenous PRN Mehreen Cheung MD         Orders (all)        Start     Ordered    06/20/25 0000  ibuprofen (ADVIL,MOTRIN) tablet 600 mg  Every 6 Hours        Placed in \"Followed by\" Linked Group    06/18/25 1946    06/19/25 2100  acetaminophen (TYLENOL) tablet 650 mg  Every 6 Hours        Placed in \"Followed by\" Linked Group    06/18/25 1946 06/19/25 0800  Ambulate Patient  2 Times Daily      Comments: After anesthesia wears off.    06/18/25 1945 06/19/25 0600  Discontinue Indwelling Urinary Catheter in AM  Once         06/18/25 1945 " "   06/19/25 0600  CBC & Differential  Morning Draw         06/18/25 1946 06/19/25 0600  CBC Auto Differential  PROCEDURE ONCE         06/18/25 2202 06/19/25 0125  Rho D Immune Globulin (RHOPHYLAC) injection 1,500 Units  Once As Needed        Note to Pharmacy: RhoGAM, MicRhoGAM, and HyperRHO S/D are IM only formulations. If IV is selected on , only Rhophylac and WinRHO products will be allowed for dispense. If NDC is missing on verification, please confirm order route and product stocked are compatible.    06/19/25 0125    06/19/25 0101  Doses of Rh Immune Globulin  Once         06/19/25 0100    06/19/25 0000  Discontinue IV  Once         06/18/25 1945 06/19/25 0000  ketorolac (TORADOL) injection 15 mg  Every 6 Hours        Placed in \"Followed by\" Linked Group    06/18/25 1946 06/18/25 2213  Postpartum RhIg Evaluation  Once         06/18/25 2213 06/18/25 2213  Fetal Bleed Screen  Once         06/18/25 2213    06/18/25 2100  sodium chloride 0.9 % flush 10 mL  Every 12 Hours Scheduled,   Status:  Discontinued         06/18/25 1448    06/18/25 2100  Respirations  Every Hour      Comments: If respiratory rate is less than 10/min, notify the Anesthesiologist    06/18/25 2004 06/18/25 2100  Hemoglobin & Hematocrit, Blood  Once,   Status:  Canceled         06/18/25 1946 06/18/25 2100  sodium chloride 0.9 % flush 3 mL  Every 12 Hours Scheduled         06/18/25 1946 06/18/25 2100  acetaminophen (TYLENOL) tablet 1,000 mg  Every 6 Hours        Placed in \"Followed by\" Linked Group    06/18/25 1946 06/18/25 2100  prenatal vitamin tablet 1 tablet  Daily         06/18/25 1946 06/18/25 2005  If Respiratory Rate is Less Than 8/Min, See Narcan Order. Notify Anesthesiologist STAT.  Continuous         06/18/25 2004 06/18/25 2005  Blood Pressure and Pulse Every 4 Hours  Continuous         06/18/25 2004 06/18/25 2005  Activity & Removal of Quispe Catheter, Per Obstetrician  Continuous         " "06/18/25 2004 06/18/25 2005  Notify Anesthesiologist for Any Questions / Problems  Continuous         06/18/25 2004 06/18/25 2005  Notify Anesthesia for Temp Over 101.4F  Continuous        Comments: May discharge from PACU with temperature at or above 95 degrees.    06/18/25 2004 06/18/25 2005  Ambu Bag at Bedside  Continuous         06/18/25 2004 06/18/25 2005  Urinary Catheter Care  Every Shift,   Status:  Canceled       06/18/25 2004 06/18/25 2004  diphenhydrAMINE (BENADRYL) capsule 25 mg  Every 4 Hours PRN        Placed in \"Or\" Linked Group    06/18/25 2004 06/18/25 2004  diphenhydrAMINE (BENADRYL) injection 25 mg  Once As Needed        Placed in \"Or\" Linked Group    06/18/25 2004 06/18/25 2004  diphenhydrAMINE (BENADRYL) injection 25 mg  Every 4 Hours PRN        Placed in \"Or\" Linked Group    06/18/25 2004 06/18/25 2000  Incentive spirometry RT  Every Hour       06/18/25 1945 06/18/25 1946  Code Status and Medical Interventions: CPR (Attempt to Resuscitate); Full  Continuous         06/18/25 1945 06/18/25 1946  Vital Signs Per hospital policy  Per Hospital Policy/Protocol         06/18/25 1945 06/18/25 1946  Notify Provider  Continuous        Comments: Open Order Report to View Parameters Requiring Provider Notification    06/18/25 1945 06/18/25 1946  Patient May Shower  Once        Comments: After anesthesia wears off and with assistance    06/18/25 1945 06/18/25 1946  Diet: Regular/House; Fluid Consistency: Thin (IDDSI 0)  Diet Effective Now         06/18/25 1945 06/18/25 1946  Advance Diet As Tolerated -Regular  Until Discontinued         06/18/25 1945 06/18/25 1946  I/O  Every Shift       06/18/25 1945 06/18/25 1946  Fundal and Lochia Check  Per Hospital Policy/Protocol        Comments: Q 30 min x 2, Q 1 hr x 4, Q 4 hrs x 24 hrs, then Q shift.    06/18/25 1945 06/18/25 1946  Continue Indwelling Urinary Catheter Already in Place  Once         06/18/25 1945 " "   06/18/25 1946  Notify Provider if Bladder Distention Continues  Until Discontinued         06/18/25 1945 06/18/25 1946  Turn Cough Deep Breathe  Once         06/18/25 1945 06/18/25 1946  Encourage early intake of PO fluids  Continuous         06/18/25 1945 06/18/25 1946  Ambulate Patient 3-5 times per day (with or without Quispe)  Every Shift       06/18/25 1945 06/18/25 1946  Apply Abdominal Binding Until Discontinued  Until Discontinued         06/18/25 1945 06/18/25 1946  \"If patient tolerates food and liquids after completion of second bag of Pitocin, saline lock IV and discontinue IV fluid infusions.  Once         06/18/25 1945 06/18/25 1946  Breast pump to bed  Once         06/18/25 1945 06/18/25 1946  If indicated -- Please administer RH Immunoglobulin based on results of cord blood evaluation and fetal screen lab tests, pharmacy to dispense  Per Order Details        Comments: See Process Instructions For Reference Range Details.    06/18/25 1945 06/18/25 1946  Insert Peripheral IV  Once         06/18/25 1946 06/18/25 1946  Saline Lock & Maintain IV Access  Continuous         06/18/25 1946 06/18/25 1946  Place Sequential Compression Device  Once         06/18/25 1946 06/18/25 1946  Maintain Sequential Compression Device  Continuous         06/18/25 1946 06/18/25 1946  VTE Prophylaxis Not Indicated: Low Risk; No Risk Factors (0)  Once         06/18/25 1946 06/18/25 1945  sodium chloride 0.9 % flush 3-10 mL  As Needed         06/18/25 1946 06/18/25 1945  sodium chloride 0.9 % infusion 40 mL  As Needed         06/18/25 1946 06/18/25 1945  oxytocin (PITOCIN) 30 units in 0.9% sodium chloride 500 mL (premix)  Once As Needed         06/18/25 1946 06/18/25 1945  oxyCODONE (ROXICODONE) immediate release tablet 5 mg  Every 4 Hours PRN        Placed in \"Or\" Linked Group    06/18/25 1946 06/18/25 1945  oxyCODONE (ROXICODONE) immediate release tablet 10 mg  Every 4 " "Hours PRN        Placed in \"Or\" Linked Group    06/18/25 1946 06/18/25 1945  docusate sodium (COLACE) capsule 100 mg  2 Times Daily PRN         06/18/25 1946 06/18/25 1945  simethicone (MYLICON) chewable tablet 80 mg  4 Times Daily PRN         06/18/25 1946 06/18/25 1945  ondansetron ODT (ZOFRAN-ODT) disintegrating tablet 4 mg  Every 6 Hours PRN        Placed in \"Or\" Linked Group    06/18/25 1946 06/18/25 1945  ondansetron (ZOFRAN) injection 4 mg  Every 6 Hours PRN        Placed in \"Or\" Linked Group    06/18/25 1946 06/18/25 1945  promethazine (PHENERGAN) tablet 12.5 mg  Every 4 Hours PRN,   Status:  Discontinued         06/18/25 1946 06/18/25 1945  promethazine (PHENERGAN) tablet 25 mg  Every 6 Hours PRN        Placed in \"Or\" Linked Group    06/18/25 1946 06/18/25 1945  promethazine (PHENERGAN) suppository 12.5 mg  Every 6 Hours PRN        Placed in \"Or\" Linked Group    06/18/25 1946 06/18/25 1945  lanolin topical 1 Application  Every 1 Hour PRN         06/18/25 1946 06/18/25 1945  carboprost (HEMABATE) injection 250 mcg  As Needed         06/18/25 1946 06/18/25 1945  miSOPROStol (CYTOTEC) tablet 800 mcg  As Needed         06/18/25 1946 06/18/25 1945  methylergonovine (METHERGINE) injection 200 mcg  As Needed         06/18/25 1946 06/18/25 1945  hydrOXYzine (ATARAX) tablet 50 mg  Every 6 Hours PRN         06/18/25 1946 06/18/25 1945  Hydrocortisone (Perianal) (ANUSOL-HC) 2.5 % rectal cream 1 Application  As Needed         06/18/25 1946 06/18/25 1945  aluminum-magnesium hydroxide-simethicone (MAALOX MAX) 400-400-40 MG/5ML suspension 15 mL  Every 4 Hours PRN        Placed in \"Or\" Linked Group    06/18/25 1946 06/18/25 1945  calcium carbonate (TUMS) chewable tablet 500 mg (200 mg elemental)  Every 4 Hours PRN        Placed in \"Or\" Linked Group    06/18/25 1946 06/18/25 1943  naloxone (NARCAN) injection 0.4 mg  Every 1 Hour PRN         06/18/25 1943 06/18/25 1756  DIET " "MESSAGE Please send regular tray to Mother Baby floor.  Once        Comments: Please send regular tray to Mother Baby floor.    06/18/25 1755    06/18/25 1745  oxytocin (PITOCIN) 30 units in 0.9% sodium chloride 500 mL (premix)  Once        Placed in \"Followed by\" Linked Group    06/18/25 1647    06/18/25 1745  oxytocin (PITOCIN) 30 units in 0.9% sodium chloride 500 mL (premix)  Once        Placed in \"Followed by\" Linked Group    06/18/25 1647    06/18/25 1745  ketorolac (TORADOL) injection 30 mg  Once         06/18/25 1647    06/18/25 1648  Notify Provider (Specified)  Until Discontinued         06/18/25 1647    06/18/25 1648  Vital Signs Per Hospital Policy  Per Hospital Policy/Protocol         06/18/25 1647    06/18/25 1648  Indwelling Urinary Catheter  Once,   Status:  Canceled         06/18/25 1647    06/18/25 1648  Diet: Regular/House; Fluid Consistency: Thin (IDDSI 0)  Diet Effective Now,   Status:  Canceled         06/18/25 1647    06/18/25 1648  Advance Diet As Tolerated -  Until Discontinued         06/18/25 1647    06/18/25 1648  Blood Gas, Arterial, Cord  Once         06/18/25 1647    06/18/25 1648  Blood Gas, Venous, Cord  Once         06/18/25 1647    06/18/25 1647  Fundal & Lochia Check  Every Shift       06/18/25 1647    06/18/25 1647  oxyCODONE-acetaminophen (PERCOCET) 5-325 MG per tablet 1 tablet  Every 4 Hours PRN,   Status:  Discontinued         06/18/25 1647    06/18/25 1647  methylergonovine (METHERGINE) injection 200 mcg  Once As Needed,   Status:  Discontinued         06/18/25 1647    06/18/25 1647  carboprost (HEMABATE) injection 250 mcg  As Needed,   Status:  Discontinued         06/18/25 1647    06/18/25 1647  miSOPROStol (CYTOTEC) tablet 800 mcg  As Needed,   Status:  Discontinued         06/18/25 1647    06/18/25 1647  ondansetron ODT (ZOFRAN-ODT) disintegrating tablet 4 mg  Every 6 Hours PRN,   Status:  Discontinued        Placed in \"Or\" Linked Group    06/18/25 1647    06/18/25 1647  " "ondansetron (ZOFRAN) injection 4 mg  Every 6 Hours PRN,   Status:  Discontinued        Placed in \"Or\" Linked Group    06/18/25 1647    06/18/25 1647  promethazine (PHENERGAN) tablet 12.5 mg  Every 6 Hours PRN,   Status:  Discontinued         06/18/25 1647    06/18/25 1600  Vital Signs q 4 while awake  Every 4 Hours      Comments: While the patient is awake.    06/18/25 1448    06/18/25 1545  lactated ringers bolus 1,000 mL  Once         06/18/25 1448    06/18/25 1545  lactated ringers infusion  Continuous         06/18/25 1448    06/18/25 1545  Sod Citrate-Citric Acid (BICITRA) oral solution 30 mL  Once         06/18/25 1448    06/18/25 1545  acetaminophen (TYLENOL) tablet 1,000 mg  Once         06/18/25 1448    06/18/25 1545  ceFAZolin 2000 mg IVPB in 100 mL NS (MBP)  Once         06/18/25 1448    06/18/25 1449  Admit To Obstetrics Inpatient  Once         06/18/25 1448    06/18/25 1449  Code Status and Medical Interventions: CPR (Attempt to Resuscitate); Full Support  Continuous,   Status:  Canceled         06/18/25 1448    06/18/25 1449  Obtain Informed Consent  Once         06/18/25 1448    06/18/25 1449  Vital Signs Per Hospital Policy  Per Hospital Policy/Protocol         06/18/25 1448    06/18/25 1449  Continuous Fetal Monitoring With NST on Admission and Prior to Initiation of Oxytocin.  Per Order Details        Comments: Continuous Fetal Monitoring With NST on Admission    06/18/25 1448    06/18/25 1449  External Uterine Contraction Monitoring  Per Hospital Policy/Protocol         06/18/25 1448    06/18/25 1449  Notify Provider (Specified)  Until Discontinued         06/18/25 1448    06/18/25 1449  Notify Provider of Tachysystole (Per Hospital Algorithm)  Until Discontinued         06/18/25 1448    06/18/25 1449  Notify Provider if Membranes Ruptured, Bleeding Greater Than 1 Pad Per Hour, Fetal Heart Tone Abnormality or Severe Pain  Until Discontinued         06/18/25 1448    06/18/25 1449  Up Ad Ericka  Until " "Discontinued         25 1448    25 1449  Insert Indwelling Urinary Catheter  Once,   Status:  Canceled        Placed in \"And\" Linked Group    25 1448    25 1449  Assess Need for Indwelling Urinary Catheter - Follow Removal Protocol  Continuous,   Status:  Canceled        Comments: Indwelling Urinary Catheter Removal Criteria  Discontinue Indwelling Urinary Catheter Unless One of the Following is Present  Urinary Retention or Obstruction  Chronic Quispe Catheter Use  End of Life  Critical Illness with Strict I/O   Tract or Abdominal Surgery  Stage 3/4 Sacral / Perineal Wound  Required Activity Restriction: Trauma  Required Activity Restriction: Spine Surgery  If Patient is Being Followed by Urology Contact Them PRIOR to Removal  Do Not Remove Indwelling Urinary Catheter Order is Present with a CLINICAL REASON to Maintain the Catheter. Provider is Required to Include a Clinical Reason to Maintain a Urinary Catheter    Chronic Quispe Catheter Use (Present on Admission)  Assess for Continued Need & Document Medical Necessity  If Infection is Suspected, Contact the Provider       Placed in \"And\" Linked Group    25 1448    25 1449  Abdominal Prep With Clippers  Once         25 1448    25 1449  Chlorhexadine Skin Prep Unless Otherwise Indicated  Once         25 1448    25 1449  SCD (Sequential Compression Devices)  Once         25 1448    25 1449  Document Gatorade Consumption Prior to Admission (Yes or No)  Once         25 1448    25 1449  NPO Diet NPO Type: Ice Chips  Diet Effective Now,   Status:  Canceled         25 1448    25 1449  Inpatient Anesthesiology Consult  Once        Specialty:  Anesthesiology  Provider:  (Not yet assigned)    25 1448    25 1449  Type & Screen  Once,   Status:  Canceled        Comments: If not obtained in PAT or if .      25 1448    25 1449  CBC (No Diff)  STAT      " "  Comments: If not obtained in PAT.      06/18/25 1448    06/18/25 1449  Insert Peripheral IV  Once         06/18/25 1448    06/18/25 1449  Saline Lock & Maintain IV Access  Continuous,   Status:  Canceled         06/18/25 1448    06/18/25 1448  Urinary Catheter Care  Every Shift,   Status:  Canceled      Placed in \"And\" Linked Group    06/18/25 1448    06/18/25 1448  sodium chloride 0.9 % flush 10 mL  As Needed,   Status:  Discontinued         06/18/25 1448    06/18/25 1448  sodium chloride 0.9 % infusion 40 mL  As Needed,   Status:  Discontinued         06/18/25 1448    06/18/25 1448  lidocaine PF 1% (XYLOCAINE) injection 0.5 mL  Once As Needed,   Status:  Discontinued         06/18/25 1448    06/10/25 0000  Follow Anesthesia Guidelines / Protocol         06/10/25 1149    06/10/25 0000  Provide NPO Instructions to Patient         06/10/25 1149    06/10/25 0000  Chlorhexidine Skin Prep        Comments: Chlorhexidine Skin Prep and Instructions For All Patients Having A Procedure Requiring an Outward Incision if Not Allergic. If Allergic, Give Antibacterial Skin Wipes and Instructions. Do Not Use For Facial Cases or on Any Mucus Membranes.    06/10/25 1149    06/10/25 0000  Obtain Informed Consent         06/10/25 1149    06/10/25 0000  Provide Patient With ERAS Instruction Handout         06/10/25 1149    06/10/25 0000  Review PO PM Tylenol         06/10/25 1149    06/10/25 0000  Review [place] AM (Time Frame Prior to Sugery         06/10/25 1149    06/10/25 0000  CBC (No Diff)         06/10/25 1149    06/10/25 0000  Type & Screen         06/10/25 1149    Unscheduled  Fundal & Lochia Check  As Needed      Comments: Every 15 Minutes x4, Then Every 30 Minutes x2, Then Every Shift    06/18/25 1647    Unscheduled  Bladder Assessment  As Needed       06/18/25 1647    Unscheduled  IV Site Care  As Needed       06/18/25 2004    Unscheduled  Blood Gas, Arterial -With Co-Ox Panel: Yes  As Needed       06/18/25 2004    " "Unscheduled  Bladder Scan if Patient Unable to Void 4-6 Hours After Catheter Removal  As Needed         25    Unscheduled  Straight Cath Every 4-6 Hours As Needed If Patient is Unable to Void After 4-6 Hours, Bladder Scan Volume is Greater Than 500mL & Patient Has Symptoms of Bladder Discomfort / Distention  As Needed       25    Unscheduled  Schedule / Prompt Voiding For Patients With Urinary Incontinence  As Needed       25    Unscheduled  KPad  As Needed      Comments: PRN pain    25    Unscheduled  Chewing Gum  As Needed       25    Unscheduled  Warm compress  As Needed       25    Unscheduled  Apply ace wrap, tight bra, or binder  As Needed       25    Unscheduled  Apply ice packs  As Needed       25    Signed and Held  Treponema pallidum AB w/Reflex RPR  STAT,   Status:  Canceled         Signed and Held                     Operative/Procedure Notes (all)        Mehreen Cheung MD at 25 1423  Version 1 of [address] [place] [address]   Section Operative Note    Pre-Operative Dx:   1.  IUP at 36w4d   2. Low lying placenta. Less than 2 cm from Cervical os  3. History of IUFD        Postoperative dx:    1.  Same     Procedure: Procedure(s):   SECTION PRIMARY   Surgeon/Assistant: Surgeon(s):  Mehreen Cheung MD         Anesthesia:  Anesthesiologist: Spinal  Anesthesiologist: Kelly Sancehz DO         QBL:  Not calculated at time of note  EBL 500mL        IV Fluids: 1250 mls.   UOP: 250 mls.    I/O this shift:  In: 1250 [I.V.:1250]  Out: 250 [Urine:250]   Antibiotics: cefazolin (Ancef)     Infant:           Gender: female infant    Weight: 3189 g (7 lb 0.5 oz)    Apgars:   @ 1 minute /       @ 5 minutes    Cord gases: Venous:  No results found for: \"PHCVEN\", \"BECVEN\"     Arterial:  No results found for: \"PHCART\", \"BECART\"     Indication for C/Section:  Low lying placenta     Priority for " C/Section:  routine     Procedure Details:   The patient was taken to the operating room after risks and benefits have been reviewed. The consent was reviewed and had been signed. Time Out was performed. She was prepped and draped in the normal sterile fashion in the dorsal supine position and a leftward tilt.  A Pfannenstiel skin incision made 2 cm above the pubic symphysis and carried down to the underlying layer of fascia with the scalpel.  The fascia was nicked in the midline and the incision was extended laterally with Drake scissors.  The anterior aspect of the incision was grasped with Kocher clamps x2 and elevated and the underlying rectus muscles were dissected off sharply and bluntly.  The inferior aspect of the incision was treated similarly.  The peritoneum was identified and entered bluntly the incision was extended with lateral traction.  A bladder blade was inserted.  The lower uterine segment was identified and incised in a transverse fashion with the scalpel.  The uterine cavity was entered bluntly and the incision was extended with cephalocaudad traction.  The fetus was then delivered atraumatically.  Cord was clamped and cut after 60 second delay and the infant was handed off to DRT staff for evaluation.  Cord blood and cord segment were obtained.  The placenta was delivered via uterine massage.  The uterus was then exteriorized and cleared of all clot and debris with clean laparotomy sponges.  The hysterotomy was closed in a single layer with a #1 chromic in a running locking fashion.  Hemostasis was noted.  The posterior cul-de-sac was irrigated and aspirated.  The hysterotomy was again inspected and noted to be hemostatic.  The uterus was returned to the abdomen.  The paracolic gutters were irrigated and aspirated.  Hysterotomy was inspected for third time and noted to be hemostatic.  The peritoneum was closed with 2-0 chromic in a running fashion.  The rectus muscles underlying the fascia were  made hemostatic with Bovie electrocautery.  The fascia was then closed with 1 Vicryl in a running stitch.  The subcutaneous tissues were irrigated aspirated and made hemostatic with Bovie electrocautery.  The subcutaneous tissues were reapproximated with 3-0 Vicryl and the skin was closed with 4-0 Monocryl in a subcuticular fashion and sealed with Dermabond.  All counts were correct and the patient was taken to the recovery room in stable condition.         Complications:   None     Specimens: none     Disposition:   Mother to Mother Baby/Postpartum  in stable condition currently.   Baby to NBN  in stable condition currently.       Mehreen Cheung MD  6/18/2025  16:42 EDT        Electronically signed by Mehreen Cheung MD at 06/18/25 1642       Physician Progress Notes (all)    No notes of this type exist for this encounter.

## 2025-06-19 NOTE — ANESTHESIA POSTPROCEDURE EVALUATION
Patient: Mendy Cote    Procedure Summary       Date: 25 Room / Location: [facility] LABOR DELIVERY   [facility] LABOR DELIVERY    Anesthesia Start: 1546 Anesthesia Stop: 163    Procedure:  SECTION PRIMARY (Abdomen) Diagnosis:     Surgeons: Mehreen Cheung MD Provider: Kelly Sanchez DO    Anesthesia Type: ITN, spinal ASA Status: 2            Anesthesia Type: ITN, spinal    Vitals  Vitals Value Taken Time   /56 25 07:23   Temp 97.9 °F (36.6 °C) 25 07:23   Pulse 68 25 07:23   Resp 16 25 07:23   SpO2 99 % 25 18:31   Vitals shown include unfiled device data.        Post Anesthesia Care and Evaluation    Patient location during evaluation: bedside  Patient participation: complete - patient participated  Level of consciousness: awake  Pain score: 0  Pain management: adequate    Airway patency: patent  Anesthetic complications: No anesthetic complications  PONV Status: none  Cardiovascular status: acceptable and blood pressure returned to baseline  Respiratory status: acceptable  Hydration status: acceptable  Post Neuraxial Block status: Motor and sensory function returned to baseline and No signs or symptoms of PDPH

## 2025-06-20 RX ORDER — FERROUS SULFATE 325(65) MG
325 TABLET ORAL
Status: DISCONTINUED | OUTPATIENT
Start: 2025-06-20 | End: 2025-06-21 | Stop reason: HOSPADM

## 2025-06-20 RX ORDER — POLYETHYLENE GLYCOL 3350 17 G/17G
17 POWDER, FOR SOLUTION ORAL DAILY
Status: DISCONTINUED | OUTPATIENT
Start: 2025-06-20 | End: 2025-06-21 | Stop reason: HOSPADM

## 2025-06-20 RX ADMIN — FERROUS SULFATE TAB 325 MG (65 MG ELEMENTAL FE) 325 MG: 325 (65 FE) TAB at 10:25

## 2025-06-20 RX ADMIN — IBUPROFEN 600 MG: 600 TABLET, FILM COATED ORAL at 18:10

## 2025-06-20 RX ADMIN — SIMETHICONE 80 MG: 80 TABLET, CHEWABLE ORAL at 02:53

## 2025-06-20 RX ADMIN — ACETAMINOPHEN 650 MG: 325 TABLET, FILM COATED ORAL at 07:54

## 2025-06-20 RX ADMIN — ACETAMINOPHEN 650 MG: 325 TABLET, FILM COATED ORAL at 02:45

## 2025-06-20 RX ADMIN — DOCUSATE SODIUM 100 MG: 100 CAPSULE, LIQUID FILLED ORAL at 02:54

## 2025-06-20 RX ADMIN — IBUPROFEN 600 MG: 600 TABLET, FILM COATED ORAL at 00:00

## 2025-06-20 RX ADMIN — IBUPROFEN 600 MG: 600 TABLET, FILM COATED ORAL at 12:17

## 2025-06-20 RX ADMIN — PRENATAL VITAMINS-IRON FUMARATE 27 MG IRON-FOLIC ACID 0.8 MG TABLET 1 TABLET: at 07:53

## 2025-06-20 RX ADMIN — ACETAMINOPHEN 650 MG: 325 TABLET, FILM COATED ORAL at 16:07

## 2025-06-20 RX ADMIN — DOCUSATE SODIUM 100 MG: 100 CAPSULE, LIQUID FILLED ORAL at 07:53

## 2025-06-20 RX ADMIN — POLYETHYLENE GLYCOL 3350 17 G: 17 POWDER, FOR SOLUTION ORAL at 10:27

## 2025-06-20 RX ADMIN — IBUPROFEN 600 MG: 600 TABLET, FILM COATED ORAL at 06:20

## 2025-06-20 RX ADMIN — ACETAMINOPHEN 650 MG: 325 TABLET, FILM COATED ORAL at 20:50

## 2025-06-20 NOTE — PROGRESS NOTES
2025    Name:Mendy Cote    MR#:[id number]     PROGRESS NOTE:  Post-Op 2 S/P        Subjective   34 y.o. yo Female  s/p CS at 36w4d doing well. Pain well controlled, lochia appropriate, tolerating diet. Infant in NICU.      Status post primary low transverse  section        Objective    Vitals  Temp:  Temp:  [98 °F (36.7 °C)-98.5 °F (36.9 °C)] 98 °F (36.7 °C)  Temp src: Oral  BP:  BP: (105-121)/(55-69) 113/69  Pulse:  Heart Rate:  [71-82] 72  RR:   Resp:  [16] 16    General Awake, alert, no distress  Abdomen Soft, non-distended, fundus firm, below umbilicus, appropriately tender  Incision  Intact, no erythema or exudate  Extremities Calves NT bilaterally     I/O last 3 completed shifts:  In: -   Out: 2925 [Urine:2925]    LABS:   Lab Results   Component Value Date    WBC 13.15 (H) 2025    HGB 10.3 (L) 2025    HCT 31.2 (L) 2025    MCV 83.2 2025     2025       Infant: female      Assessment   1.  POD 2 from  Section  2. PP anemia due to acute blood loss    Plan: Doing well.    Plan discharge home tomorrow   PO Iron          ALBERT Martinez  2025 09:12 EDT

## 2025-06-20 NOTE — LACTATION NOTE
06/20/25 1810   Maternal Information   Date of Referral 06/20/25   Person Making Referral lactation consultant  (courtesy f/u visit)   Infant Reason for Referral 35-37 weeks gestation   Milk Expression/Equipment   Breast Pump Type double electric, hospital grade;double electric, personal  (I took S2 from [employer] supply)     Infant came down from NICU. Mom states latching has gone well so far. Her son is interpreting. I took the cart in room, but an  never came on by the time I was leaving the room. Mom understands what I am saying. She has no questions at this time. Breast feeding tips and information sheet provided in Welsh for Mom. She states she has breast fed #1-4. They are all in room visiting at this time. Encouraged to call lactation with any questions/concerns. Took S2 pump from [employer] supply.

## 2025-06-21 VITALS
OXYGEN SATURATION: 99 % | RESPIRATION RATE: 16 BRPM | HEART RATE: 83 BPM | TEMPERATURE: 98.1 F | SYSTOLIC BLOOD PRESSURE: 110 MMHG | DIASTOLIC BLOOD PRESSURE: 71 MMHG

## 2025-06-21 RX ORDER — OXYCODONE HYDROCHLORIDE 5 MG/1
5 TABLET ORAL EVERY 8 HOURS PRN
Qty: 8 TABLET | Refills: 0 | Status: SHIPPED | OUTPATIENT
Start: 2025-06-21 | End: 2025-06-25

## 2025-06-21 RX ORDER — FERROUS SULFATE 325(65) MG
325 TABLET ORAL
Qty: 30 TABLET | Refills: 1 | Status: SHIPPED | OUTPATIENT
Start: 2025-06-22

## 2025-06-21 RX ORDER — PSEUDOEPHEDRINE HCL 30 MG
100 TABLET ORAL 2 TIMES DAILY PRN
Qty: 60 CAPSULE | Refills: 1 | Status: SHIPPED | OUTPATIENT
Start: 2025-06-21

## 2025-06-21 RX ORDER — IBUPROFEN 800 MG/1
800 TABLET, FILM COATED ORAL EVERY 8 HOURS PRN
Qty: 60 TABLET | Refills: 1 | Status: SHIPPED | OUTPATIENT
Start: 2025-06-21

## 2025-06-21 RX ADMIN — OXYCODONE 5 MG: 5 TABLET ORAL at 03:42

## 2025-06-21 RX ADMIN — ACETAMINOPHEN 650 MG: 325 TABLET, FILM COATED ORAL at 03:42

## 2025-06-21 RX ADMIN — Medication 1 APPLICATION: at 12:16

## 2025-06-21 RX ADMIN — ACETAMINOPHEN 650 MG: 325 TABLET, FILM COATED ORAL at 08:07

## 2025-06-21 RX ADMIN — FERROUS SULFATE TAB 325 MG (65 MG ELEMENTAL FE) 325 MG: 325 (65 FE) TAB at 08:07

## 2025-06-21 RX ADMIN — DOCUSATE SODIUM 100 MG: 100 CAPSULE, LIQUID FILLED ORAL at 08:07

## 2025-06-21 RX ADMIN — PRENATAL VITAMINS-IRON FUMARATE 27 MG IRON-FOLIC ACID 0.8 MG TABLET 1 TABLET: at 08:07

## 2025-06-21 RX ADMIN — IBUPROFEN 600 MG: 600 TABLET, FILM COATED ORAL at 06:16

## 2025-06-21 RX ADMIN — OXYCODONE 5 MG: 5 TABLET ORAL at 10:15

## 2025-06-21 RX ADMIN — IBUPROFEN 600 MG: 600 TABLET, FILM COATED ORAL at 00:05

## 2025-06-21 RX ADMIN — IBUPROFEN 600 MG: 600 TABLET, FILM COATED ORAL at 12:05

## 2025-06-21 NOTE — PAYOR COMM NOTE
"Mendy Elizabeth (34 y.o. Female) Notification of discharge on 25. [place]      Date of Birth   1990    Social Security Number       Address   [address] [address]    Home Phone   [phone]    MRN   [id number]       [place]   Protestant    Marital Status                               Admission Date   2025    Admission Type   Elective    Admitting Provider   Mehreen Cheung MD    Attending Provider       Department, Room/Bed   [facility] MOTHER BABY 4A, N410/1       Discharge Date   2025    Discharge Disposition   Home or Self Care    Discharge Destination                                 Attending Provider: (none)   Allergies: No Known Allergies    Isolation: None   Infection: None   Code Status: CPR    Ht: 154.9 cm (61\")   Wt: 70.4 kg (155 lb 3.3 oz)    Admission Cmt: None   Principal Problem: Term pregnancy [Z34.90]                   Active Insurance as of 2025       Primary Coverage       Payor Plan Insurance Group Employer/Plan Group    [facility] COMMUNITY PLAN [facility] COMMUNITY PLAN [facility]       Payor Plan Address Payor Plan Phone Number Payor Plan Fax Number Effective Dates    [address]   2025 - None Entered    [facility] [id number]         Subscriber Name Subscriber Birth Date Member ID       MENDY ELIZABETH 1990 [id number]                     Emergency Contacts        (Rel.) Home Phone Work Phone Mobile Phone    JOHANNA ELIZABETH (Spouse) [phone] -- [phone]    BrendaKathieSasha (Friend) -- -- [phone]                 Discharge Summary        Radha Beltran CNM at 25 1008       Attestation signed by Mehreen Cheung MD at 25 1110      I have reviewed this documentation and agree.                      [facility]   Discharge Summary      Patient: Mendy Elizabeth      MR#:[id number]  Admission  Diagnosis: Term pregnancy      Discharge Diagnosis:   1. Previous  " section    2. Status post primary low transverse  section        Date of Admission: 2025  Date of Discharge:  2025    Procedures:  , Low Transverse    2025   4:08 PM     Service:  Obstetrics    Hospital Course:  Patient underwent low transverse  section low lying placenta and remained in the hospital for 3 days.  During that time she remained afebrile and hemodynamically stable.  On the day of discharge, she was eating, ambulating and voiding without difficulty.  Incision was well-approximated, clean and dry, no oozing noted.  Fundus is firm at U -1, lochia is small.  Baby is being discharged, mom is bresat feeding      Labs:    Lab Results (last 24 hours)       ** No results found for the last 24 hours. **               Discharge Medications        New Medications        Instructions Start Date   docusate sodium 100 MG capsule   100 mg, Oral, 2 Times Daily PRN      ferrous sulfate 325 (65 FE) MG tablet   325 mg, Oral, Daily With Breakfast   Start Date: 2025     ibuprofen 800 MG tablet  Commonly known as: ADVIL,MOTRIN   800 mg, Oral, Every 8 Hours PRN      oxyCODONE 5 MG immediate release tablet  Commonly known as: ROXICODONE   5 mg, Oral, Every 8 Hours PRN             Continue These Medications        Instructions Start Date   PRENATAL VITAMIN PO   1 tablet, Daily               Discharge Disposition:  To Home    Discharge Condition:  Stable    Discharge Diet: regular    Activity at Discharge: pelvic rest    Follow-up Appointments  2 weeks with Chicho Beltran CNM  25  10:08 EDT    Electronically signed by Mehreen Cheung MD at 25 8207

## 2025-06-21 NOTE — DISCHARGE SUMMARY
[facility]   Discharge Summary      Patient: Mendy Cote      MR#:[id number]  Admission  Diagnosis: Term pregnancy      Discharge Diagnosis:   1. Previous  section    2. Status post primary low transverse  section        Date of Admission: 2025  Date of Discharge:  2025    Procedures:  , Low Transverse    2025   4:08 PM     Service:  Obstetrics    Hospital Course:  Patient underwent low transverse  section low lying placenta and remained in the hospital for 3 days.  During that time she remained afebrile and hemodynamically stable.  On the day of discharge, she was eating, ambulating and voiding without difficulty.  Incision was well-approximated, clean and dry, no oozing noted.  Fundus is firm at U -1, lochia is small.  Baby is being discharged, mom is bresat feeding      Labs:    Lab Results (last 24 hours)       ** No results found for the last 24 hours. **               Discharge Medications        New Medications        Instructions Start Date   docusate sodium 100 MG capsule   100 mg, Oral, 2 Times Daily PRN      ferrous sulfate 325 (65 FE) MG tablet   325 mg, Oral, Daily With Breakfast   Start Date: 2025     ibuprofen 800 MG tablet  Commonly known as: ADVIL,MOTRIN   800 mg, Oral, Every 8 Hours PRN      oxyCODONE 5 MG immediate release tablet  Commonly known as: ROXICODONE   5 mg, Oral, Every 8 Hours PRN             Continue These Medications        Instructions Start Date   PRENATAL VITAMIN PO   1 tablet, Daily               Discharge Disposition:  To Home    Discharge Condition:  Stable    Discharge Diet: regular    Activity at Discharge: pelvic rest    Follow-up Appointments  2 weeks with Chicho Beltran CNM  25  10:08 EDT

## 2025-06-21 NOTE — PLAN OF CARE
Goal Outcome Evaluation:  Plan of Care Reviewed With: patient        Progress: improving  Outcome Evaluation: vs lochia incision wnl, pain contyrolled, bonding well w/nb

## 2025-06-22 ENCOUNTER — MATERNAL SCREENING (OUTPATIENT)
Dept: CALL CENTER | Facility: HOSPITAL | Age: 35
End: 2025-06-22
Payer: MEDICAID

## 2025-06-22 NOTE — OUTREACH NOTE
Maternal Screening Survey      Flowsheet Row Responses   Eligibility Eligible   Prep survey completed? Yes   Facility patient discharged from? [facility] - Registered Nurse

## 2025-06-30 ENCOUNTER — MATERNAL SCREENING (OUTPATIENT)
Dept: CALL CENTER | Facility: HOSPITAL | Age: 35
End: 2025-06-30
Payer: MEDICAID

## 2025-06-30 NOTE — OUTREACH NOTE
Maternal Screening Survey      Flowsheet Row Responses   Facility patient discharged from? [facility]   Attempt successful? No   Unsuccessful attempts Attempt 2              [facility] - Registered Nurse

## 2025-06-30 NOTE — OUTREACH NOTE
Maternal Screening Survey      Flowsheet Row Responses   Facility patient discharged from? [facility]   Attempt successful? No   Unsuccessful attempts Attempt 1              [facility] - Registered Nurse

## 2025-07-01 ENCOUNTER — MATERNAL SCREENING (OUTPATIENT)
Dept: CALL CENTER | Facility: HOSPITAL | Age: 35
End: 2025-07-01
Payer: MEDICAID

## 2025-07-01 NOTE — OUTREACH NOTE
Maternal Screening Survey      Flowsheet Row Responses   Facility patient discharged from? [facility]   Attempt successful? No  [ ID: [id number]]   Unsuccessful attempts Attempt 3   Revoke Unable to reach              [facility]ARRIS - Registered Nurse

## 2025-07-09 NOTE — ANESTHESIA PROCEDURE NOTES
Spinal Block      Patient reassessed immediately prior to procedure    Patient location during procedure: OB  Indication:procedure for pain  Performed By  Anesthesiologist: Kelly Sanchez DO  Preanesthetic Checklist  Completed: patient identified, IV checked, risks and benefits discussed, surgical consent, monitors and equipment checked, pre-op evaluation and timeout performed  Spinal Block Prep:  Patient Position:sitting  Sterile Tech:cap, gloves, mask and sterile barriers  Prep:Chloraprep  Patient Monitoring:blood pressure monitoring and EKG    Spinal Block Procedure  Approach:midline  Guidance:palpation technique  Location:L3-L4  Needle Type:Gabriella  Needle Gauge:25 G  Placement of Spinal needle event:cerebrospinal fluid aspirated  Paresthesia: no  Fluid Appearance:clear  Medications: bupivacaine in dextrose (MARCAINE SPINAL / Hyberbaric) 0.75-8.25 % injection - Intrathecal   1.2 mL - 6/18/2025 3:54:00 PM   Post Assessment  Patient Tolerance:patient tolerated the procedure well with no apparent complications  Complications no           09-Jul-2025 06:41

## (undated) DEVICE — CLTH CLENS READYCLEANSE PERI CARE PK/5

## (undated) DEVICE — SOL IRR NACL 0.9PCT BO 1000ML

## (undated) DEVICE — 4-PORT MANIFOLD: Brand: NEPTUNE 2

## (undated) DEVICE — PENCL SMOKE/EVAC MEGADYNE TELESCP 10FT

## (undated) DEVICE — GLV SURG BIOGEL LTX PF 6

## (undated) DEVICE — SOL IRR H2O BO 1000ML STRL

## (undated) DEVICE — SUT GUT CHRM 1 CTX 36IN 905H

## (undated) DEVICE — COATED VICRYL  (POLYGLACTIN 910) SUTURE, VIOLET BRAIDED, STERILE, SYNTHETIC ABSORBABLE SUTURE: Brand: COATED VICRYL

## (undated) DEVICE — APPL CHLORAPREP TINTED 26ML TEAL

## (undated) DEVICE — TRAP FLD MINIVAC MEGADYNE 100ML

## (undated) DEVICE — PATIENT RETURN ELECTRODE, SINGLE-USE, CONTACT QUALITY MONITORING, ADULT, WITH 9FT CORD, FOR PATIENTS WEIGING OVER 33LBS. (15KG): Brand: MEGADYNE

## (undated) DEVICE — MAT PREVALON MOBL TRANSFR AIR W/PAD REPROC 39X81IN

## (undated) DEVICE — SUT GUT CHRM 2/0 CT1 27IN 811H

## (undated) DEVICE — SUT MONOCRYL SZ 4 0 18IN PS1 Y682H BX/36

## (undated) DEVICE — SUT VIC 3/0 CT1 27IN DYED J338H

## (undated) DEVICE — GLV SURG BIOGEL LTX PF 6 1/2

## (undated) DEVICE — PK C/SECT 10